# Patient Record
Sex: MALE | Race: BLACK OR AFRICAN AMERICAN | Employment: UNEMPLOYED | URBAN - METROPOLITAN AREA
[De-identification: names, ages, dates, MRNs, and addresses within clinical notes are randomized per-mention and may not be internally consistent; named-entity substitution may affect disease eponyms.]

---

## 2020-06-30 ENCOUNTER — TELEPHONE (OUTPATIENT)
Dept: PHARMACY | Facility: CLINIC | Age: 60
End: 2020-06-30

## 2020-06-30 NOTE — TELEPHONE ENCOUNTER
CLINICAL PHARMACY: ADHERENCE REVIEW  Identified care gap per [employer]; fills at [facility]: ACE/ARB and Statin adherence    Last Office Visit: 4/23/20    Patient identified as LIS = 1, therefore patient may be able to receive a 90-day supply for the same cost as a 30-day supply      ASSESSMENT  ACE/ARB ADHERENCE  PDC: 100%    Per [facility]   AMLOD/BENAZP CAP 5-10MG last filled on 6/26/20 for a 30 day supply; SIG: QD; last picked up on 6/26/20. 0 refills remaining. Patient needs June appt for further refills    BP Readings from Last 3 Encounters:   06/14/19 118/82   12/14/18 120/70   06/01/18 116/68     CrCl cannot be calculated (Patient's most recent lab result is older than the maximum 120 days allowed. ). STATIN ADHERENCE  PDC: 100%    Per [facility]   Atorvastatin 10mg last filled on 6/12/20 for a 90 day supply; M/W/F #38 tabs  Prior 3/21/20    No results found for: CHOL, TRIG, HDL, LDLCHOLESTEROL, LDLCALC, LDLDIRECT  ALT   Date Value Ref Range Status   03/26/2016 32 0 - 40 U/L Final     AST   Date Value Ref Range Status   03/26/2016 30 0 - 39 U/L Final     The ASCVD Risk score (Carlee Zelaya, et al., 2013) failed to calculate for the following reasons: The systolic blood pressure is missing    Cannot find a previous HDL lab    Cannot find a previous total cholesterol lab     PLAN  Reached patient to review. Verified medication instructions     Reminded him he needs to schedule follow up with PCP in order to get more refills. Should be getting 90 day supply.

## 2020-08-13 ENCOUNTER — TELEPHONE (OUTPATIENT)
Dept: PHARMACY | Facility: CLINIC | Age: 60
End: 2020-08-13

## 2020-08-13 NOTE — TELEPHONE ENCOUNTER
CLINICAL PHARMACY: ADHERENCE REVIEW  Identified care gap per [employer]; fills at [facility]: ACE/ARB adherence    Last Office Visit: 4/23/20    Patient also appears to be taking: statin     ASSESSMENT  ACE/ARB ADHERENCE  PDC: 100% in 2019; <85% YTD    Per [facility]   Amlodipine-benazepril 5-10mg daily last filled on 5/21/20 for a 30 day supply; 0 refills remaining. (prev fills: #30 3/20, 4/13)  - [facility] in [facility] reports the 6/26 e-rx was transferred out to [facility] on [address]; [facility] on [facility] reports the only fill they have/see is the 5/21 fill. Neither pharmacy reports still having an active/fillable amlodipine-benazepril rx; [facility] reports they did send refill request on 7/16/20. Per [address] [place] [address] , patient is LIS-1, so eligible for 90-ds at same price as 30-ds. Per 6/22 refill encounter re amlodipine-benazepril: LMOM to schedule pt for follow up appt for refills. Advised pt  sent in a 30 day supply of BP medication. BP Readings from Last 3 Encounters:   06/14/19 118/82   12/14/18 120/70   06/01/18 116/68     CrCl cannot be calculated (Patient's most recent lab result is older than the maximum 120 days allowed. ). STATIN ADHERENCE  PDC: 99% in 2019; 100% YTD    Per Insurance Records   Atorvastatin 10mg Mon,Weds,Fri filled on 3/21/20 & 6/12/20 for a 90 day supply; 2 refills remaining. No results found for: CHOL, TRIG, HDL, LDLCHOLESTEROL, LDLCALC, LDLDIRECT  ALT   Date Value Ref Range Status   03/26/2016 32 0 - 40 U/L Final     AST   Date Value Ref Range Status   03/26/2016 30 0 - 39 U/L Final     The ASCVD Risk score (Apolonia Condon, et al., 2013) failed to calculate for the following reasons: The systolic blood pressure is missing    Cannot find a previous HDL lab    Cannot find a previous total cholesterol lab       PLAN  Reached patient to review. He gave phone to his mother.  Mother reports patient just talked Dr. Esteban Mireles within the past week or two Dr. Esteban Mireles is now with [facility], no longer [facility] ([facility])); states

## 2021-03-23 ENCOUNTER — IMMUNIZATION (OUTPATIENT)
Dept: PRIMARY CARE CLINIC | Age: 61
End: 2021-03-23
Payer: MEDICARE

## 2021-03-23 DIAGNOSIS — Z23 ENCOUNTER FOR IMMUNIZATION: Primary | ICD-10-CM

## 2021-03-23 PROCEDURE — 91301 COVID-19, MODERNA VACCINE 100MCG/0.5ML DOSE: CPT | Performed by: STUDENT IN AN ORGANIZED HEALTH CARE EDUCATION/TRAINING PROGRAM

## 2021-03-23 PROCEDURE — 0011A COVID-19, MODERNA VACCINE 100MCG/0.5ML DOSE: CPT | Performed by: STUDENT IN AN ORGANIZED HEALTH CARE EDUCATION/TRAINING PROGRAM

## 2021-04-06 ENCOUNTER — OFFICE VISIT (OUTPATIENT)
Dept: PRIMARY CARE CLINIC | Age: 61
End: 2021-04-06
Payer: COMMERCIAL

## 2021-04-06 VITALS
SYSTOLIC BLOOD PRESSURE: 162 MMHG | OXYGEN SATURATION: 98 % | HEART RATE: 77 BPM | TEMPERATURE: 97.3 F | DIASTOLIC BLOOD PRESSURE: 113 MMHG

## 2021-04-06 DIAGNOSIS — E78.5 HYPERLIPIDEMIA, UNSPECIFIED HYPERLIPIDEMIA TYPE: ICD-10-CM

## 2021-04-06 DIAGNOSIS — I10 ESSENTIAL HYPERTENSION: Primary | ICD-10-CM

## 2021-04-06 DIAGNOSIS — N40.0 BENIGN PROSTATIC HYPERPLASIA WITHOUT LOWER URINARY TRACT SYMPTOMS: ICD-10-CM

## 2021-04-06 DIAGNOSIS — Z72.0 TOBACCO ABUSE: ICD-10-CM

## 2021-04-06 DIAGNOSIS — J44.9 CHRONIC OBSTRUCTIVE PULMONARY DISEASE, UNSPECIFIED COPD TYPE (HCC): ICD-10-CM

## 2021-04-06 PROCEDURE — 99343 PR HOME VST NEW PATIENT MOD-HI SEVERITY 45 MINUTES: CPT | Performed by: STUDENT IN AN ORGANIZED HEALTH CARE EDUCATION/TRAINING PROGRAM

## 2021-04-06 RX ORDER — ATORVASTATIN CALCIUM 10 MG/1
10 TABLET, FILM COATED ORAL
Qty: 36 TABLET | Refills: 1 | Status: SHIPPED
Start: 2021-04-07 | End: 2022-01-18

## 2021-04-06 RX ORDER — AMLODIPINE BESYLATE AND BENAZEPRIL HYDROCHLORIDE 5; 10 MG/1; MG/1
1 CAPSULE ORAL DAILY
Qty: 90 CAPSULE | Refills: 1 | Status: SHIPPED
Start: 2021-04-06 | End: 2021-06-24 | Stop reason: DRUGHIGH

## 2021-04-06 ASSESSMENT — ENCOUNTER SYMPTOMS
CONSTIPATION: 0
ABDOMINAL PAIN: 0
SHORTNESS OF BREATH: 0
NAUSEA: 0
BACK PAIN: 0
COUGH: 1
DIARRHEA: 0

## 2021-04-06 NOTE — PROGRESS NOTES
[facility]      4/6/2021    Home visit medically necessary in lieu of an office visit due to:  Developmentally delayed, needs personal assistance of others to leave for appointments. Chief Complaint: Lois Patel is a 57 y.o. male with chief complaint of   Chief Complaint   Patient presents with    Hypertension    Establish Care       Assessment & Plan      Essential hypertension  · Patient was previously treated with benazepril, amlodipine, and hydrochlorothiazide but has been out of medications as he has been unable to get to his PCP. · Patient's blood pressure is elevated today but patient is asymptomatic. · Restart combination amlodipine-benazepril 5-10 mg daily. · Follow-up at next visit. If blood pressure still elevated, will consider restarting hydrochlorothiazide. Hyperlipidemia  · Per history with most recent lipid panel in 2018. · Restart atorvastatin 10 mg 3 times a week. · Depending on next lipid panel results, will consider whether this dose needs adjusted. Chronic obstructive pulmonary disease  · Previously recorded in chart but family is unsure if this was formally diagnosed. · Patient does have a longstanding smoking history. · Patient's cough may be related to underlying COPD. · Does not appear to be in exacerbation. · Not currently on any inhalers. · Monitor symptoms and need to initiate inhalers. Benign prostatic hyperplasia  · Per history and previously followed with Dr. Edwin Mera. · Not currently on medications and not endorsing urinary symptoms at this time. · Monitor for recurrent symptoms. Tobacco abuse  · Patient currently smoking 3 cigarettes/day previously was smoking more. · Patient has tried to quit multiple times. · Discussed benefits of cessation and offered a prescription for nicotine replacement. · Patient declined assistance with cessation at this time.       Follow Up:  Return in about 6 weeks (around 5/18/2021), or if symptoms worsen or fail to improve. Subjective:     History of Present Illness:  Genevieve Delaney is a 57 y.o. male with significant past medical history of essential hypertension, COPD, tobacco use, hyperlipidemia, and and intellectual developmental delay who was evaluated in his home for his hypertension and to establish care with a new physician. The patient was seen with his sister Archie Chauhan present. The patient is a generally healthy gentleman who has only been treated for blood pressure in the past.  The patient technically lives next-door to his mother, but spends most of his time with her and assisting with care as her condition has worsened. He takes care of housework and yard work. The patient has some form of intellectual delay but his sister is unsure if there is a specific cause or diagnosis. He completed a special education program and graduated high school. He has been unable to get to his appointments with his previous PCP and has been out of his antihypertensives. The patient states that his only symptom today is of a cough. He has had it for approximately a month. He currently smokes a few cigarettes a day. He is unsure if it is related to this or possibly to allergies. He is to smoke more heavily and has tried to quit in the past.  He is not interested in assistance with quitting at this time. The patient denies chest pain, headaches, dyspnea, lightheadedness. He has no other specific complaints. The patient has a history of BPH and used to see Dr. Ellis Witt for this. He denies urinary symptoms at this time. The patient has not had any routine cancer screenings. Education: Completed high school, special education program.  Occupation: [employer]  Spirituality: [employer], [employer]     HC-POA: Not completed  Living will: Not completed  Code status: Full code      Functional Status (I: Independent, A: Assisted, D: Dependent)  ADLs I A D Notes Bathing [x]  []  []     Dressing [x]  []  []     Toileting [x]  []  []     Transfers [x]  []  []     Feeding [x]  []  []     Ambulation [x]  []  []  Assistive devices: none   IADLs I A D    Telephone [x]  []  []     Transportation []  []  [x]  Driving safety concerns: NA patient does not drive    Shopping []  []  [x]     Meal prep []  []  [x]     Housework []  [x]  []     Medications [x]  []  []     Finances []  []  [x]           Past Medical History:   Diagnosis Date    Benign prostatic hyperplasia     COPD [facility]chronic obstructive pulmonary disease) ([facility] [address]     Developmental delay     Hard of hearing     Hyperlipidemia     Hypertension     Obesity     Tobacco abuse 04/26/2016       Past Surgical History:   Procedure Laterality Date    INGUINAL HERNIA REPAIR      lap left inguinal hernia repair with mesh       Family History   Problem Relation Age of Onset    Hypotension Mother     Dementia Mother     Diabetes Father     COPD Father     Dementia Father        Social History     Socioeconomic History    Marital status: Single     Spouse name: Not on file    Number of children: Not on file    Years of education: Not on file    Highest education level: Not on file   Occupational History    Not on file   Social Needs    Financial resource strain: Not on file    Food insecurity     Worry: Not on file     Inability: Not on file    Transportation needs     Medical: Not on file     Non-medical: Not on file   Tobacco Use    Smoking status: Current Some Day Smoker     Packs/day: 0.12     Years: 35.00     Pack years: 4.20     Types: Cigarettes    Smokeless tobacco: Never Used   Substance and Sexual Activity    Alcohol use: No    Drug use: No    Sexual activity: Not on file   Lifestyle    Physical activity     Days per week: Not on file     Minutes per session: Not on file    Stress: Not on file   Relationships    Social connections     Talks on phone: Not on file     Gets together: Not on file Attends Judaism service: Not on file     Active member of club or organization: Not on file     Attends meetings of clubs or organizations: Not on file     Relationship status: Not on file    Intimate partner violence     Fear of current or ex partner: Not on file     Emotionally abused: Not on file     Physically abused: Not on file     Forced sexual activity: Not on file   Other Topics Concern    Not on file   Social History Narrative    Not on file       Current Medications:  Medications reviewed: yes    Current Outpatient Medications on File Prior to Visit   Medication Sig Dispense Refill    amLODIPine-benazepril (LOTREL) 5-10 MG per capsule TAKE 1 CAPSULE BY MOUTH DAILY. 30 capsule 0    atorvastatin (LIPITOR) 10 MG tablet TAKE 1 TABLET BY MOUTH EVERY MONDAY WEDNESDAY AND FRIDAY 38 tablet 2    hydroCHLOROthiazide (HYDRODIURIL) 25 MG tablet 1 daily 30 tablet 2    aspirin 81 MG tablet Take 81 mg by mouth daily       No current facility-administered medications on file prior to visit. Review of Systems   Constitutional: Negative for appetite change, chills, fever and unexpected weight change. Eyes: Negative for visual disturbance. Respiratory: Positive for cough. Negative for shortness of breath. Cardiovascular: Negative for chest pain and leg swelling. Gastrointestinal: Negative for abdominal pain, constipation, diarrhea and nausea. Genitourinary: Negative for difficulty urinating and dysuria. Musculoskeletal: Negative for arthralgias, back pain and myalgias. Skin: Negative for rash and wound. Neurological: Negative for dizziness, syncope, light-headedness and headaches. Psychiatric/Behavioral: Negative for dysphoric mood. The patient is not nervous/anxious. Objective:     Physical Exam  BP (!) 162/113   Pulse 77   Temp 97.3 °F (36.3 °C)   SpO2 98%     Physical Exam  Vitals signs reviewed. Constitutional:       General: He is not in acute distress.      Appearance: He is well-developed. He is not ill-appearing. HENT:      Head: Normocephalic and atraumatic. Right Ear: External ear normal.      Left Ear: External ear normal.      Nose: Nose normal.      Mouth/Throat:      Mouth: Mucous membranes are moist.      Pharynx: Oropharynx is clear. Eyes:      Extraocular Movements: Extraocular movements intact. Conjunctiva/sclera: Conjunctivae normal.      Pupils: Pupils are equal, round, and reactive to light. Neck:      Musculoskeletal: Neck supple. Thyroid: No thyromegaly. Vascular: No carotid bruit. Cardiovascular:      Rate and Rhythm: Normal rate and regular rhythm. Pulses: Normal pulses. Heart sounds: Normal heart sounds. Pulmonary:      Effort: Pulmonary effort is normal.      Breath sounds: Normal breath sounds. No wheezing, rhonchi or rales. Abdominal:      General: Bowel sounds are normal. There is no distension. Palpations: Abdomen is soft. There is no hepatomegaly. Tenderness: There is no abdominal tenderness. Hernia: No hernia is present. Musculoskeletal:         General: No tenderness. Right lower leg: No edema. Left lower leg: No edema. Lymphadenopathy:      Cervical: No cervical adenopathy. Skin:     General: Skin is warm and dry. Neurological:      General: No focal deficit present. Mental Status: He is alert. Mental status is at baseline. Cranial Nerves: Cranial nerves are intact. Sensory: Sensation is intact. Motor: Motor function is intact. No tremor. Deep Tendon Reflexes:      Reflex Scores:       Bicep reflexes are 2+ on the right side and 2+ on the left side. Psychiatric:         Attention and Perception: Attention normal.         Mood and Affect: Mood and affect normal.         Speech: Speech normal.         Behavior: Behavior normal. Behavior is cooperative.          Recent Labs:    Lab Results   Component Value Date    WBC 7.7 03/26/2016    HGB 13.8 03/26/2016 HCT 41.5 03/26/2016    MCV 89.7 03/26/2016     03/26/2016    LYMPHOPCT 9 (L) 03/26/2016    RBC 4.63 03/26/2016    MCH 29.9 03/26/2016    MCHC 33.3 03/26/2016    RDW 13.6 03/26/2016       Lab Results   Component Value Date     03/26/2016    K 3.9 03/26/2016    CL 96 (L) 03/26/2016    CO2 23 03/26/2016    BUN 13 03/26/2016    CREATININE 1.2 03/26/2016    GLUCOSE 115 (H) 03/26/2016    CALCIUM 8.8 03/26/2016    PROT 7.3 03/26/2016    LABALBU 3.5 03/26/2016    BILITOT 0.5 03/26/2016    ALKPHOS 50 03/26/2016    AST 30 03/26/2016    ALT 32 03/26/2016    LABGLOM >60 03/26/2016    GFRAA >60 03/26/2016       No results found for: VITD25, TSH    No results found for: LABA1C    No results found for: INR, PROTIME       Gareth Garcia MD  4/6/2021    This report was generated using a computer driven dictation system. This system may results in transcription errors. Every effort is made to identify and correct errors, however errors may still occur. General

## 2021-04-20 ENCOUNTER — IMMUNIZATION (OUTPATIENT)
Dept: PRIMARY CARE CLINIC | Age: 61
End: 2021-04-20
Payer: MEDICARE

## 2021-04-20 DIAGNOSIS — Z23 ENCOUNTER FOR IMMUNIZATION: Primary | ICD-10-CM

## 2021-04-20 PROCEDURE — 91301 COVID-19, MODERNA VACCINE 100MCG/0.5ML DOSE: CPT | Performed by: STUDENT IN AN ORGANIZED HEALTH CARE EDUCATION/TRAINING PROGRAM

## 2021-04-20 PROCEDURE — 0012A COVID-19, MODERNA VACCINE 100MCG/0.5ML DOSE: CPT | Performed by: STUDENT IN AN ORGANIZED HEALTH CARE EDUCATION/TRAINING PROGRAM

## 2021-04-20 NOTE — PROGRESS NOTES
[address] was seen for COVID-19 vaccination at his home as part of the [facility]. The patient was seen for dose 2 of the Moderna vaccine. The EUA was reviewed with the patient and all questions answered. The attestation was reviewed without contraindications to the immunization and the patient consented to the immunization. The patient's medications and allergies were reviewed. The patient was given the vaccine intramuscularly into the left deltoid. The patient tolerated the immunization well without obvious adverse events. The patient was observed for at least 15 minutes. At the time of leaving the home, the patient was doing well and at baseline condition. The patient was given the [facility] COVID-19 vaccination record card and instructed to keep it safe and readily available. The patient was educated regarding the potential side effects and given instructions that for lethargy, lightheadedness, difficulty breathing, or other acute reactions the patient should call 911 for evaluation in the ED.     Renu Mcpherson MD  4/20/2021

## 2021-05-01 PROBLEM — N40.0 BENIGN PROSTATIC HYPERPLASIA: Status: ACTIVE | Noted: 2021-05-01

## 2021-06-24 ENCOUNTER — OFFICE VISIT (OUTPATIENT)
Dept: PRIMARY CARE CLINIC | Age: 61
End: 2021-06-24
Payer: COMMERCIAL

## 2021-06-24 VITALS
HEART RATE: 80 BPM | SYSTOLIC BLOOD PRESSURE: 135 MMHG | TEMPERATURE: 97.4 F | OXYGEN SATURATION: 97 % | DIASTOLIC BLOOD PRESSURE: 97 MMHG

## 2021-06-24 DIAGNOSIS — Z72.0 TOBACCO ABUSE: ICD-10-CM

## 2021-06-24 DIAGNOSIS — E78.5 HYPERLIPIDEMIA, UNSPECIFIED HYPERLIPIDEMIA TYPE: ICD-10-CM

## 2021-06-24 DIAGNOSIS — N40.0 BENIGN PROSTATIC HYPERPLASIA WITHOUT LOWER URINARY TRACT SYMPTOMS: ICD-10-CM

## 2021-06-24 DIAGNOSIS — J44.9 CHRONIC OBSTRUCTIVE PULMONARY DISEASE, UNSPECIFIED COPD TYPE (HCC): ICD-10-CM

## 2021-06-24 DIAGNOSIS — I10 ESSENTIAL HYPERTENSION: Primary | ICD-10-CM

## 2021-06-24 DIAGNOSIS — Z12.5 ENCOUNTER FOR PROSTATE CANCER SCREENING: ICD-10-CM

## 2021-06-24 PROCEDURE — 99349 HOME/RES VST EST MOD MDM 40: CPT | Performed by: STUDENT IN AN ORGANIZED HEALTH CARE EDUCATION/TRAINING PROGRAM

## 2021-06-24 RX ORDER — AMLODIPINE BESYLATE AND BENAZEPRIL HYDROCHLORIDE 10; 20 MG/1; MG/1
1 CAPSULE ORAL DAILY
Qty: 90 CAPSULE | Refills: 1 | Status: SHIPPED
Start: 2021-06-24 | End: 2021-12-14

## 2021-06-24 ASSESSMENT — ENCOUNTER SYMPTOMS
SHORTNESS OF BREATH: 0
CONSTIPATION: 0
ABDOMINAL PAIN: 0
BACK PAIN: 0
NAUSEA: 0
COUGH: 0

## 2021-06-24 NOTE — PROGRESS NOTES
[facility]      2021    Home visit medically necessary in lieu of an office visit due to:  Developmentally delayed, needs personal assistance of others to leave for appointments. Chief Complaint: Abel Bishop is a 57 y.o. male with chief complaint of   Chief Complaint   Patient presents with    Hypertension    Hyperlipidemia       Assessment & Plan      Essential hypertension  · Patient was previously treated with benazepril, amlodipine, and hydrochlorothiazide but had been out of these since being lost to follow-up with PCP. · Patient's blood pressure is improved after restarting amlodipine-benazepril but given his generally good health and younger age, will attempt tighter control. · Increase amlodipine-benazepril to 10-20 mg daily. · Follow-up at next visit. If blood pressure still elevated, will consider restarting hydrochlorothiazide. Hyperlipidemia  · Per history with most recent lipid panel in 2018. · Restarted atorvastatin 10 mg 3 times a week. · Order for lipid panel placed. Chronic obstructive pulmonary disease  · Previously recorded in chart but family is unsure if this was formally diagnosed. · Patient does have a longstanding smoking history. · Patient's cough may be related to underlying COPD. · Does not appear to be in exacerbation. · Not currently on any inhalers. · Monitor symptoms and need to initiate inhalers. Benign prostatic hyperplasia  · Per history and previously followed with Dr. Yaneth Ochoa. · Not currently on medications and not endorsing urinary symptoms at this time. · Monitor for recurrent symptoms. Recheck PSA. Tobacco abuse  · Patient currently smoking 2-3 cigarettes/day and previously was smoking more. · Patient has tried to quit multiple times, and reports most recently he had a setback after his mother . · Discussed benefits of cessation and offered a prescription for nicotine replacement.   · Patient declined assistance with cessation at this time. Encounter for prostate cancer screening  · Order for PSA placed. Follow Up:  Return in about 8 weeks (around 2021), or if symptoms worsen or fail to improve. Subjective:     History of Present Illness:  Toshia Delgadillo is a 57 y.o. male with significant past medical history of essential hypertension, COPD, tobacco use, hyperlipidemia, and and intellectual developmental delay who was evaluated in his home for multiple medical issues. The patient states that he is feeling well. He denies any pain at this time. He denies any chest pain, headaches, or lightheadedness. He feels that his breathing is doing well. He denies any difficulty urinating related to his BPH. He continues to smoke a few cigarettes per day and has been cutting back. He states that he was doing well at this however his mother . His sister feels that he is doing well and has no specific concerns. His family is interested in getting him routine labs. The patient denies any specific needs at this time. Education: Completed high school, special education program.  Occupation: [employer]  Spirituality: [employer], [place].     HC-POA: Not completed  Living will: Not completed  Code status: Full code      Functional Status (I: Independent, A: Assisted, D: Dependent)  ADLs I A D Notes   Bathing [x]  []  []     Dressing [x]  []  []     Toileting [x]  []  []     Transfers [x]  []  []     Feeding [x]  []  []     Ambulation [x]  []  []  Assistive devices: none   IADLs I A D    Telephone [x]  []  []     Transportation []  []  [x]  Driving safety concerns: NA patient does not drive    Shopping []  []  [x]     Meal prep []  []  [x]     Housework []  [x]  []     Medications [x]  []  []     Finances []  []  [x]           Past Medical History:   Diagnosis Date    Benign prostatic hyperplasia     [facility]ch[facility] obstructive [facility] disease) ([facility] [address]     Developmental delay     Hard of hearing     Hyperlipidemia     Hypertension     Obesity     Tobacco abuse 04/26/2016       Past Surgical History:   Procedure Laterality Date    INGUINAL HERNIA REPAIR      lap left inguinal hernia repair with mesh       Family History   Problem Relation Age of Onset    Hypotension Mother     Dementia Mother     Diabetes Father     COPD Father     Dementia Father        Social History     Socioeconomic History    Marital status: Single     Spouse name: Not on file    Number of children: Not on file    Years of education: Not on file    Highest education level: Not on file   Occupational History    Not on file   Tobacco Use    Smoking status: Current Some Day Smoker     Packs/day: 0.12     Years: 35.00     Pack years: 4.20     Types: Cigarettes    Smokeless tobacco: Never Used   Substance and Sexual Activity    Alcohol use: No    Drug use: No    Sexual activity: Not on file   Other Topics Concern    Not on file   Social History Narrative    Not on file     Social Determinants of Health     Financial Resource Strain:     Difficulty of Paying Living Expenses:    Food Insecurity:     Worried About Running Out of Food in the Last Year:     Ran Out of Food in the Last Year:    Transportation Needs:     Lack of Transportation (Medical):      Lack of Transportation (Non-Medical):    Physical Activity:     Days of Exercise per Week:     Minutes of Exercise per Session:    Stress:     Feeling of Stress :    Social Connections:     Frequency of Communication with Friends and Family:     Frequency of Social Gatherings with Friends and Family:     Attends Sabianist Services:     Active Member of Clubs or Organizations:     Attends Club or Organization Meetings:     Marital Status:    Intimate Partner Violence:     Fear of Current or Ex-Partner:     Emotionally Abused:     Physically Abused:     Sexually Abused:        Current Medications:  Medications reviewed: Right lower leg: No edema. Left lower leg: No edema. Lymphadenopathy:      Cervical: No cervical adenopathy. Skin:     General: Skin is warm and dry. Neurological:      General: No focal deficit present. Mental Status: He is alert. Mental status is at baseline. Psychiatric:         Attention and Perception: Attention normal.         Mood and Affect: Mood and affect normal.         Speech: Speech normal.         Behavior: Behavior normal. Behavior is cooperative. Recent Labs:    Lab Results   Component Value Date    WBC 7.7 03/26/2016    HGB 13.8 03/26/2016    HCT 41.5 03/26/2016    MCV 89.7 03/26/2016     03/26/2016    LYMPHOPCT 9 (L) 03/26/2016    RBC 4.63 03/26/2016    MCH 29.9 03/26/2016    MCHC 33.3 03/26/2016    RDW 13.6 03/26/2016       Lab Results   Component Value Date     03/26/2016    K 3.9 03/26/2016    CL 96 (L) 03/26/2016    CO2 23 03/26/2016    BUN 13 03/26/2016    CREATININE 1.2 03/26/2016    GLUCOSE 115 (H) 03/26/2016    CALCIUM 8.8 03/26/2016    PROT 7.3 03/26/2016    LABALBU 3.5 03/26/2016    BILITOT 0.5 03/26/2016    ALKPHOS 50 03/26/2016    AST 30 03/26/2016    ALT 32 03/26/2016    LABGLOM >60 03/26/2016    GFRAA >60 03/26/2016       No results found for: VITD25, TSH    No results found for: LABA1C    No results found for: INR, PROTIME       Adam Gamboa MD  6/24/2021    This report was generated using a computer driven dictation system. This system may results in transcription errors. Every effort is made to identify and correct errors, however errors may still occur.

## 2021-08-17 ENCOUNTER — HOSPITAL ENCOUNTER (OUTPATIENT)
Age: 61
Discharge: HOME | End: 2021-08-17
Payer: COMMERCIAL

## 2021-08-17 DIAGNOSIS — Z72.0 TOBACCO ABUSE: ICD-10-CM

## 2021-08-17 DIAGNOSIS — N40.0 BENIGN PROSTATIC HYPERPLASIA WITHOUT LOWER URINARY TRACT SYMPTOMS: ICD-10-CM

## 2021-08-17 DIAGNOSIS — J44.9 CHRONIC OBSTRUCTIVE PULMONARY DISEASE, UNSPECIFIED COPD TYPE (HCC): ICD-10-CM

## 2021-08-17 DIAGNOSIS — E78.5 HYPERLIPIDEMIA, UNSPECIFIED HYPERLIPIDEMIA TYPE: ICD-10-CM

## 2021-08-17 DIAGNOSIS — Z12.5 ENCOUNTER FOR PROSTATE CANCER SCREENING: ICD-10-CM

## 2021-08-17 DIAGNOSIS — I10 ESSENTIAL HYPERTENSION: ICD-10-CM

## 2021-08-17 LAB
ALBUMIN SERPL-MCNC: 3.9 G/DL (ref 3.5–5.2)
ALP BLD-CCNC: 83 U/L (ref 40–129)
ALT SERPL-CCNC: 33 U/L (ref 0–40)
ANION GAP SERPL CALCULATED.3IONS-SCNC: 11 MMOL/L (ref 7–16)
AST SERPL-CCNC: 24 U/L (ref 0–39)
BASOPHILS ABSOLUTE: 0.05 E9/L (ref 0–0.2)
BASOPHILS RELATIVE PERCENT: 0.5 % (ref 0–2)
BILIRUB SERPL-MCNC: 0.5 MG/DL (ref 0–1.2)
BUN BLDV-MCNC: 10 MG/DL (ref 6–23)
CALCIUM SERPL-MCNC: 9.6 MG/DL (ref 8.6–10.2)
CHLORIDE BLD-SCNC: 103 MMOL/L (ref 98–107)
CHOLESTEROL, TOTAL: 115 MG/DL (ref 0–199)
CO2: 25 MMOL/L (ref 22–29)
CREAT SERPL-MCNC: 1.1 MG/DL (ref 0.7–1.2)
EOSINOPHILS ABSOLUTE: 0.21 E9/L (ref 0.05–0.5)
EOSINOPHILS RELATIVE PERCENT: 2.2 % (ref 0–6)
GFR AFRICAN AMERICAN: >60
GFR NON-AFRICAN AMERICAN: >60 ML/MIN/1.73
GLUCOSE BLD-MCNC: 115 MG/DL (ref 74–99)
HCT VFR BLD CALC: 43.2 % (ref 37–54)
HDLC SERPL-MCNC: 45 MG/DL
HEMOGLOBIN: 14.3 G/DL (ref 12.5–16.5)
IMMATURE GRANULOCYTES #: 0.05 E9/L
IMMATURE GRANULOCYTES %: 0.5 % (ref 0–5)
LDL CHOLESTEROL CALCULATED: 50 MG/DL (ref 0–99)
LYMPHOCYTES ABSOLUTE: 2.72 E9/L (ref 1.5–4)
LYMPHOCYTES RELATIVE PERCENT: 28 % (ref 20–42)
MCH RBC QN AUTO: 29 PG (ref 26–35)
MCHC RBC AUTO-ENTMCNC: 33.1 % (ref 32–34.5)
MCV RBC AUTO: 87.6 FL (ref 80–99.9)
MONOCYTES ABSOLUTE: 0.65 E9/L (ref 0.1–0.95)
MONOCYTES RELATIVE PERCENT: 6.7 % (ref 2–12)
NEUTROPHILS ABSOLUTE: 6.05 E9/L (ref 1.8–7.3)
NEUTROPHILS RELATIVE PERCENT: 62.1 % (ref 43–80)
PDW BLD-RTO: 13.6 FL (ref 11.5–15)
PLATELET # BLD: 293 E9/L (ref 130–450)
PMV BLD AUTO: 9.2 FL (ref 7–12)
POTASSIUM SERPL-SCNC: 4.1 MMOL/L (ref 3.5–5)
PROSTATE SPECIFIC ANTIGEN: 0.41 NG/ML (ref 0–4)
RBC # BLD: 4.93 E12/L (ref 3.8–5.8)
SODIUM BLD-SCNC: 139 MMOL/L (ref 132–146)
TOTAL PROTEIN: 7.6 G/DL (ref 6.4–8.3)
TRIGL SERPL-MCNC: 98 MG/DL (ref 0–149)
VLDLC SERPL CALC-MCNC: 20 MG/DL
WBC # BLD: 9.7 E9/L (ref 4.5–11.5)

## 2021-08-17 PROCEDURE — 80053 COMPREHEN METABOLIC PANEL: CPT

## 2021-08-17 PROCEDURE — 36415 COLL VENOUS BLD VENIPUNCTURE: CPT

## 2021-08-17 PROCEDURE — G0103 PSA SCREENING: HCPCS

## 2021-08-17 PROCEDURE — 80061 LIPID PANEL: CPT

## 2021-08-17 PROCEDURE — 85025 COMPLETE CBC W/AUTO DIFF WBC: CPT

## 2021-08-19 ENCOUNTER — OFFICE VISIT (OUTPATIENT)
Dept: PRIMARY CARE CLINIC | Age: 61
End: 2021-08-19
Payer: COMMERCIAL

## 2021-08-19 DIAGNOSIS — Z11.59 ENCOUNTER FOR HEPATITIS C SCREENING TEST FOR LOW RISK PATIENT: ICD-10-CM

## 2021-08-19 DIAGNOSIS — I10 ESSENTIAL HYPERTENSION: Primary | ICD-10-CM

## 2021-08-19 DIAGNOSIS — N40.0 BENIGN PROSTATIC HYPERPLASIA WITHOUT LOWER URINARY TRACT SYMPTOMS: ICD-10-CM

## 2021-08-19 DIAGNOSIS — E78.5 HYPERLIPIDEMIA, UNSPECIFIED HYPERLIPIDEMIA TYPE: ICD-10-CM

## 2021-08-19 DIAGNOSIS — R73.9 HYPERGLYCEMIA: ICD-10-CM

## 2021-08-19 DIAGNOSIS — Z72.0 TOBACCO ABUSE: ICD-10-CM

## 2021-08-19 DIAGNOSIS — J44.9 CHRONIC OBSTRUCTIVE PULMONARY DISEASE, UNSPECIFIED COPD TYPE (HCC): ICD-10-CM

## 2021-08-19 PROCEDURE — 99349 HOME/RES VST EST MOD MDM 40: CPT | Performed by: STUDENT IN AN ORGANIZED HEALTH CARE EDUCATION/TRAINING PROGRAM

## 2021-08-19 ASSESSMENT — ENCOUNTER SYMPTOMS
BACK PAIN: 0
COUGH: 1
CONSTIPATION: 0
SHORTNESS OF BREATH: 0
ABDOMINAL PAIN: 0

## 2021-08-19 ASSESSMENT — PATIENT HEALTH QUESTIONNAIRE - PHQ9
1. LITTLE INTEREST OR PLEASURE IN DOING THINGS: 0
SUM OF ALL RESPONSES TO PHQ9 QUESTIONS 1 & 2: 0
2. FEELING DOWN, DEPRESSED OR HOPELESS: 0
SUM OF ALL RESPONSES TO PHQ QUESTIONS 1-9: 0

## 2021-08-19 NOTE — PROGRESS NOTES
[facility]      2021    Home visit medically necessary in lieu of an office visit due to:  Developmentally delayed, needs personal assistance of others to leave for appointments. Chief Complaint: Yesenia Duncan is a 57 y.o. male with chief complaint of   Chief Complaint   Patient presents with    Hypertension       Assessment & Plan      Essential hypertension  · Patient was previously treated with benazepril, amlodipine, and hydrochlorothiazide but had been out of these since being lost to follow-up with PCP. · Blood pressures improved after increasing amlodipine-benazepril. · At this time, continue amlodipine-benazepril 10-20 mg daily. Hyperlipidemia  · Most recent cholesterol panel well controlled in 2021. · Continue atorvastatin 10 mg 3 times a week. Chronic obstructive pulmonary disease  · Previously recorded in chart but family is unsure if this was formally diagnosed. · Patient does have a longstanding smoking history. · Patient's cough may be related to underlying COPD. · Does not appear to be in exacerbation. · Not currently on any inhalers. · Monitor symptoms and need to initiate inhalers. Hyperglycemia  · Patient with mildly elevated glucose on fasting labs. · Check hemoglobin A1c. Benign prostatic hyperplasia  · Per history and previously followed with Dr. Dale Tillman. · Not currently on medications and not endorsing urinary symptoms at this time. · PSA 0.41 in 2021. · Monitor for recurrent symptoms. Tobacco abuse  · Patient currently smoking 2-3 cigarettes/day and previously was smoking more. · Patient has tried to quit multiple times, and reports most recently he had a setback after his mother . · Discussed benefits of cessation and offered a prescription for nicotine replacement. · Patient declined assistance with cessation at this time.     Encounter for hepatitis C virus screening  · Order for hepatitis C virus antibody placed      Follow Up:  Return in about 8 weeks (around 10/14/2021), or if symptoms worsen or fail to improve. Subjective:     History of Present Illness:  Marina Delaney is a 57 y.o. male with significant past medical history of essential hypertension, COPD, tobacco use, hyperlipidemia, and and intellectual developmental delay who was evaluated in his home for multiple medical issues. He is doing well. He does have some grief related to his mother's passing which happened approximately 3 months ago. He has been doing well with this, and his sister reports that it seems to be normal grieving as he is no longer going over to her house to see her like he previously was. He denies any major issues at this time. He does have a mild chronic cough. He continues to smoke about 2 cigarettes a day which is not increasing. He is not interested in quitting at this time. He denies any chest pain, lightheadedness, headaches. He denies any other issues. Discussed the patient's labs with the patient and sister. Discussed that based on these, it would be a good idea to check hemoglobin A1c. Also discussed that if he is going out to get blood work, it is recommended that adults get a one-time HCV and HIV screen. His sister was okay with the HCV check but declined the HIV screen. They deny any needs at this time. Education: Completed high school, special education program.  Occupation: [employer]  Spirituality: Yaz[employer], [employer].     HC-POA: Not completed  Living will: Not completed  Code status: Full code      Functional Status (I: Independent, A: Assisted, D: Dependent)  ADLs I A D Notes   Bathing [x]  []  []     Dressing [x]  []  []     Toileting [x]  []  []     Transfers [x]  []  []     Feeding [x]  []  []     Ambulation [x]  []  []  Assistive devices: none   IADLs I A D    Telephone [x]  []  []     Transportation []  []  [x]  Driving safety concerns: NA patient does not drive Shopping []  []  [x]     Meal prep []  []  [x]     Housework []  [x]  []     Medications [x]  []  []     Finances []  []  [x]           Past Medical History:   Diagnosis Date    Benign prostatic hyperplasia     COPD [facility]chronic obstructive pulmonary disease) ([facility] [address]     Developmental delay     Hard of hearing     Hyperlipidemia     Hypertension     Obesity     Tobacco abuse 04/26/2016       Past Surgical History:   Procedure Laterality Date    INGUINAL HERNIA REPAIR      lap left inguinal hernia repair with mesh       Family History   Problem Relation Age of Onset    Hypotension Mother     Dementia Mother     Diabetes Father     COPD Father     Dementia Father        Social History     Socioeconomic History    Marital status: Single     Spouse name: Not on file    Number of children: Not on file    Years of education: Not on file    Highest education level: Not on file   Occupational History    Not on file   Tobacco Use    Smoking status: Current Some Day Smoker     Packs/day: 0.12     Years: 35.00     Pack years: 4.20     Types: Cigarettes    Smokeless tobacco: Never Used   Substance and Sexual Activity    Alcohol use: No    Drug use: No    Sexual activity: Not on file   Other Topics Concern    Not on file   Social History Narrative    Not on file     Social Determinants of Health     Financial Resource Strain:     Difficulty of Paying Living Expenses:    Food Insecurity:     Worried About Running Out of Food in the Last Year:     Ran Out of Food in the Last Year:    Transportation Needs:     Lack of Transportation (Medical):      Lack of Transportation (Non-Medical):    Physical Activity:     Days of Exercise per Week:     Minutes of Exercise per Session:    Stress:     Feeling of Stress :    Social Connections:     Frequency of Communication with Friends and Family:     Frequency of Social Gatherings with Friends and Family:     Attends Mormonism Services:     Active Member of Clubs or Organizations:    [facility] or Organization Meetings:     Marital Status:    Intimate Partner Violence:     Fear of Current or Ex-Partner:     Emotionally Abused:     Physically Abused:     Sexually Abused:        Current Medications:  Medications reviewed: yes    Current Outpatient Medications on File Prior to Visit   Medication Sig Dispense Refill    amLODIPine-benazepril (LOTREL) 10-20 MG per capsule Take 1 capsule by mouth daily 90 capsule 1    atorvastatin (LIPITOR) 10 MG tablet Take 1 tablet by mouth three times a week TAKE 1 TABLET BY MOUTH EVERY MONDAY WEDNESDAY AND FRIDAY 36 tablet 1    aspirin 81 MG tablet Take 81 mg by mouth daily       No current facility-administered medications on file prior to visit. Review of Systems   Constitutional: Positive for unexpected weight change (slight decline). Negative for appetite change. Respiratory: Positive for cough (mild). Negative for shortness of breath. Cardiovascular: Negative for chest pain. Gastrointestinal: Negative for abdominal pain and constipation. Genitourinary: Negative for difficulty urinating. Musculoskeletal: Negative for arthralgias, back pain and myalgias. Neurological: Negative for light-headedness and headaches. Psychiatric/Behavioral: Positive for sleep disturbance. Negative for dysphoric mood. The patient is not nervous/anxious. Objective:     Physical Exam  /88   Pulse 71   Temp 97.1 °F (36.2 °C)   SpO2 98%     Physical Exam  Vitals reviewed. Constitutional:       General: He is not in acute distress. Appearance: He is well-developed. He is not ill-appearing. HENT:      Head: Normocephalic and atraumatic. Mouth/Throat:      Mouth: Mucous membranes are moist.   Eyes:      Conjunctiva/sclera: Conjunctivae normal.      Pupils: Pupils are equal, round, and reactive to light. Neck:      Thyroid: No thyromegaly. Vascular: No carotid bruit.    Cardiovascular:      Rate and Rhythm: Normal rate and regular rhythm. Pulses: Normal pulses. Heart sounds: Normal heart sounds. Pulmonary:      Effort: Pulmonary effort is normal.      Breath sounds: Normal breath sounds. No wheezing, rhonchi or rales. Abdominal:      General: Bowel sounds are normal. There is no distension. Palpations: Abdomen is soft. Tenderness: There is no abdominal tenderness. Musculoskeletal:      Right lower leg: No edema. Left lower leg: No edema. Lymphadenopathy:      Cervical: No cervical adenopathy. Skin:     General: Skin is warm and dry. Neurological:      General: No focal deficit present. Mental Status: He is alert. Mental status is at baseline. Psychiatric:         Attention and Perception: Attention normal.         Mood and Affect: Mood and affect normal.         Speech: Speech normal.         Behavior: Behavior normal. Behavior is cooperative. Recent Labs:    Lab Results   Component Value Date    WBC 9.7 08/17/2021    HGB 14.3 08/17/2021    HCT 43.2 08/17/2021    MCV 87.6 08/17/2021     08/17/2021    LYMPHOPCT 28.0 08/17/2021    RBC 4.93 08/17/2021    MCH 29.0 08/17/2021    MCHC 33.1 08/17/2021    RDW 13.6 08/17/2021       Lab Results   Component Value Date     08/17/2021    K 4.1 08/17/2021     08/17/2021    CO2 25 08/17/2021    BUN 10 08/17/2021    CREATININE 1.1 08/17/2021    GLUCOSE 115 (H) 08/17/2021    CALCIUM 9.6 08/17/2021    PROT 7.6 08/17/2021    LABALBU 3.9 08/17/2021    BILITOT 0.5 08/17/2021    ALKPHOS 83 08/17/2021    AST 24 08/17/2021    ALT 33 08/17/2021    LABGLOM >60 08/17/2021    GFRAA >60 08/17/2021       No results found for: VITD25, TSH    No results found for: LABA1C    No results found for: INR, PROTIME       Clarice Florence MD  8/19/2021    This report was generated using a computer driven dictation system. This system may results in transcription errors.  Every effort is made to identify and correct errors, however errors may still occur.

## 2021-10-19 ENCOUNTER — OFFICE VISIT (OUTPATIENT)
Dept: PRIMARY CARE CLINIC | Age: 61
End: 2021-10-19
Payer: COMMERCIAL

## 2021-10-19 VITALS
OXYGEN SATURATION: 96 % | SYSTOLIC BLOOD PRESSURE: 132 MMHG | DIASTOLIC BLOOD PRESSURE: 93 MMHG | HEART RATE: 76 BPM | TEMPERATURE: 97.1 F

## 2021-10-19 DIAGNOSIS — N40.0 BENIGN PROSTATIC HYPERPLASIA WITHOUT LOWER URINARY TRACT SYMPTOMS: ICD-10-CM

## 2021-10-19 DIAGNOSIS — J44.9 CHRONIC OBSTRUCTIVE PULMONARY DISEASE, UNSPECIFIED COPD TYPE (HCC): ICD-10-CM

## 2021-10-19 DIAGNOSIS — Z72.0 TOBACCO ABUSE: ICD-10-CM

## 2021-10-19 DIAGNOSIS — E78.5 HYPERLIPIDEMIA, UNSPECIFIED HYPERLIPIDEMIA TYPE: ICD-10-CM

## 2021-10-19 DIAGNOSIS — R73.9 HYPERGLYCEMIA: ICD-10-CM

## 2021-10-19 DIAGNOSIS — I10 ESSENTIAL HYPERTENSION: Primary | ICD-10-CM

## 2021-10-19 PROCEDURE — 99349 HOME/RES VST EST MOD MDM 40: CPT | Performed by: STUDENT IN AN ORGANIZED HEALTH CARE EDUCATION/TRAINING PROGRAM

## 2021-10-19 NOTE — PROGRESS NOTES
replacement. · Patient declined assistance with cessation at this time. Follow Up:  Return in about 3 months (around 1/19/2022). Subjective:     History of Present Illness:  Gilma Maher is a 57 y.o. male with significant past medical history of essential hypertension, COPD, tobacco use, hyperlipidemia, and and intellectual developmental delay who was evaluated in his home for multiple medical issues. They report that he is doing well. He has no chest pain, dyspnea, or cough. He does occasionally get mild dyspnea on exertion. He has no headaches or lightheadedness. He has had no bleeding on his aspirin. He continues to smoke 2-3 cigarettes/day and declines intervention. Discussed blood pressure goals especially in the setting of his younger age and minimal comorbidities. Discussed possibilities of attempting tighter control and increasing medications. His sister was hesitant about possible side effects especially as the patient lives alone. Education: Completed high school, special education program.  Occupation: [employer]  Spirituality: [employer], [employer].     HC-POA: Not completed  Living will: Not completed  Code status: Full code      Functional Status (I: Independent, A: Assisted, D: Dependent)  ADLs I A D Notes   Bathing [x]  []  []     Dressing [x]  []  []     Toileting [x]  []  []     Transfers [x]  []  []     Feeding [x]  []  []     Ambulation [x]  []  []  Assistive devices: none   IADLs I A D    Telephone [x]  []  []     Transportation []  []  [x]  Driving safety concerns: NA patient does not drive    Shopping []  []  [x]     Meal prep []  []  [x]     Housework []  [x]  []     Medications [x]  []  []     Finances []  []  [x]           Past Medical History:   Diagnosis Date    Benign prostatic hyperplasia     [facility]ch[facility] obstructive [facility] disease) ([facility] [address]     Developmental delay     Hard of hearing     Hyperlipidemia     Hypertension     Obesity     Tobacco abuse 04/26/2016       Past Surgical History:   Procedure Laterality Date    INGUINAL HERNIA REPAIR      lap left inguinal hernia repair with mesh       Family History   Problem Relation Age of Onset    Hypotension Mother     Dementia Mother     Diabetes Father     COPD Father     Dementia Father        Social History     Socioeconomic History    Marital status: Single     Spouse name: Not on file    Number of children: Not on file    Years of education: Not on file    Highest education level: Not on file   Occupational History    Not on file   Tobacco Use    Smoking status: Current Some Day Smoker     Packs/day: 0.12     Years: 35.00     Pack years: 4.20     Types: Cigarettes    Smokeless tobacco: Never Used   Substance and Sexual Activity    Alcohol use: No    Drug use: No    Sexual activity: Not on file   Other Topics Concern    Not on file   Social History Narrative    Not on file     Social Determinants of Health     Financial Resource Strain:     Difficulty of Paying Living Expenses:    Food Insecurity:     Worried About Running Out of Food in the Last Year:     Ran Out of Food in the Last Year:    Transportation Needs:     Lack of Transportation (Medical):      Lack of Transportation (Non-Medical):    Physical Activity:     Days of Exercise per Week:     Minutes of Exercise per Session:    Stress:     Feeling of Stress :    Social Connections:     Frequency of Communication with Friends and Family:     Frequency of Social Gatherings with Friends and Family:     Attends [employer] Services:     Active Member of Clubs or Organizations:     Attends Club or Organization Meetings:     Marital Status:    Intimate Partner Violence:     Fear of Current or Ex-Partner:     Emotionally Abused:     Physically Abused:     Sexually Abused:        Current Medications:  Medications reviewed: yes    Current Outpatient Medications on File Prior to Visit   Medication Sig Dispense Refill  amLODIPine-benazepril (LOTREL) 10-20 MG per capsule Take 1 capsule by mouth daily 90 capsule 1    atorvastatin (LIPITOR) 10 MG tablet Take 1 tablet by mouth three times a week TAKE 1 TABLET BY MOUTH EVERY MONDAY WEDNESDAY AND FRIDAY 36 tablet 1    aspirin 81 MG tablet Take 81 mg by mouth daily       No current facility-administered medications on file prior to visit. Review of Systems   Constitutional: Negative for appetite change. Respiratory: Positive for shortness of breath (mild, on exertion). Negative for cough. Cardiovascular: Negative for chest pain. Gastrointestinal: Negative for abdominal pain. Genitourinary: Negative for difficulty urinating. Musculoskeletal: Negative for arthralgias and back pain. Neurological: Negative for light-headedness and headaches. Objective:     Physical Exam  BP (!) 132/93   Pulse 76   Temp 97.1 °F (36.2 °C)   SpO2 96%     Physical Exam  Vitals reviewed. Constitutional:       General: He is not in acute distress. Appearance: He is well-developed. He is not ill-appearing. HENT:      Head: Normocephalic and atraumatic. Mouth/Throat:      Mouth: Mucous membranes are moist.   Neck:      Thyroid: No thyromegaly. Vascular: No carotid bruit. Cardiovascular:      Rate and Rhythm: Normal rate and regular rhythm. Pulses: Normal pulses. Heart sounds: Normal heart sounds. Pulmonary:      Effort: Pulmonary effort is normal.      Breath sounds: Normal breath sounds. No wheezing. Abdominal:      General: Bowel sounds are normal. There is no distension. Palpations: Abdomen is soft. Tenderness: There is no abdominal tenderness. Musculoskeletal:      Right lower leg: No edema. Left lower leg: No edema. Lymphadenopathy:      Cervical: No cervical adenopathy. Skin:     General: Skin is warm and dry. Neurological:      General: No focal deficit present. Mental Status: He is alert.  Mental status is at

## 2021-12-07 VITALS
HEART RATE: 71 BPM | OXYGEN SATURATION: 98 % | DIASTOLIC BLOOD PRESSURE: 88 MMHG | TEMPERATURE: 97.1 F | SYSTOLIC BLOOD PRESSURE: 124 MMHG

## 2021-12-07 ASSESSMENT — ENCOUNTER SYMPTOMS
SHORTNESS OF BREATH: 1
BACK PAIN: 0
ABDOMINAL PAIN: 0
COUGH: 0

## 2022-01-25 ENCOUNTER — OFFICE VISIT (OUTPATIENT)
Dept: PRIMARY CARE CLINIC | Age: 62
End: 2022-01-25
Payer: COMMERCIAL

## 2022-01-25 VITALS
SYSTOLIC BLOOD PRESSURE: 136 MMHG | DIASTOLIC BLOOD PRESSURE: 89 MMHG | TEMPERATURE: 98.3 F | OXYGEN SATURATION: 97 % | HEART RATE: 86 BPM

## 2022-01-25 DIAGNOSIS — R73.9 HYPERGLYCEMIA: ICD-10-CM

## 2022-01-25 DIAGNOSIS — Z72.0 TOBACCO ABUSE: ICD-10-CM

## 2022-01-25 DIAGNOSIS — J44.9 CHRONIC OBSTRUCTIVE PULMONARY DISEASE, UNSPECIFIED COPD TYPE (HCC): ICD-10-CM

## 2022-01-25 DIAGNOSIS — Z23 NEED FOR IMMUNIZATION AGAINST INFLUENZA: ICD-10-CM

## 2022-01-25 DIAGNOSIS — I10 ESSENTIAL HYPERTENSION: Primary | ICD-10-CM

## 2022-01-25 DIAGNOSIS — E78.5 HYPERLIPIDEMIA, UNSPECIFIED HYPERLIPIDEMIA TYPE: ICD-10-CM

## 2022-01-25 DIAGNOSIS — N40.0 BENIGN PROSTATIC HYPERPLASIA WITHOUT LOWER URINARY TRACT SYMPTOMS: ICD-10-CM

## 2022-01-25 PROCEDURE — 90694 VACC AIIV4 NO PRSRV 0.5ML IM: CPT | Performed by: STUDENT IN AN ORGANIZED HEALTH CARE EDUCATION/TRAINING PROGRAM

## 2022-01-25 PROCEDURE — 99349 HOME/RES VST EST MOD MDM 40: CPT | Performed by: STUDENT IN AN ORGANIZED HEALTH CARE EDUCATION/TRAINING PROGRAM

## 2022-01-25 PROCEDURE — 90471 IMMUNIZATION ADMIN: CPT | Performed by: STUDENT IN AN ORGANIZED HEALTH CARE EDUCATION/TRAINING PROGRAM

## 2022-01-25 ASSESSMENT — ENCOUNTER SYMPTOMS
NAUSEA: 0
SHORTNESS OF BREATH: 0
COUGH: 0
BACK PAIN: 0
ABDOMINAL PAIN: 0
CONSTIPATION: 0

## 2022-01-25 NOTE — PROGRESS NOTES
[facility]      2022    Home visit medically necessary in lieu of an office visit due to:  Developmentally delayed, needs personal assistance of others to leave for appointments. Chief Complaint: Humberto Aaron is a 57 y.o. male with chief complaint of   Chief Complaint   Patient presents with    Hypertension       Assessment & Plan      Essential hypertension  · Blood pressures reasonably controlled on current regimen. · Discussed blood pressure goals in the setting of younger age and minimal comorbidities and the risks of long-term organ damage with prolonged elevated blood pressures. Discussed possibly pursuing tighter control. Sister notes hesitation as the patient lives alone in case he had side effects and she is unable to get him regularly due to this now. · At this time, continue amlodipine-benazepril 10-20 mg daily.     Hyperlipidemia  · Most recent cholesterol panel well controlled in 2021. · Continue atorvastatin 10 mg 3 times a week.     Chronic obstructive pulmonary disease  · Previously recorded in chart but family is unsure if this was formally diagnosed. · Patient does have a longstanding smoking history. · Patient's cough may be related to underlying COPD. · Does not appear to be in exacerbation. · Not currently on any inhalers. · Monitor symptoms and need to initiate inhalers.     Hyperglycemia  · Patient with mildly elevated glucose on fasting labs. · Awaiting hemoglobin A1c check.     Benign prostatic hyperplasia  · Per history and previously followed with Dr. Willem Her. · Not currently on medications and not endorsing urinary symptoms at this time. · PSA 0.41 in 2021. · Monitor for recurrent symptoms.     Tobacco abuse  · Patient currently smoking 2-3 cigarettes/day and previously was smoking more. · Patient has tried to quit multiple times, and reports most recently he had a setback after his mother .   · Discussed benefits of cessation and offered a prescription for nicotine replacement. · Patient declined assistance with cessation at this time. Need for immunization against influenza  · Influenza vaccine administered today. Follow Up:  Return in about 3 months (around 4/25/2022), or if symptoms worsen or fail to improve. Subjective:     History of Present Illness:  Charlie Burnett is a 57 y.o. male with significant past medical history of essential hypertension, COPD, tobacco use, hyperlipidemia, and and intellectual developmental delay who was evaluated in his home for multiple medical issues. Later in the visit, his sister called by phone to discuss his condition. He states things are going well. He has no specific complaints. He has no pain at this time. He denies chest pain or shortness of breath. He has no lightheadedness or headaches. He has no GI or  complaints. He feels he is tolerating his medications well without issues. He is taking all 3 of his medications regularly. His sister reports that he is doing well. He recently had his COVID-19 booster shot and tolerated it well. He continues to smoke a small amount and is not interested in cessation at this time. Discussed again that as the patient does otherwise younger and healthier, he may benefit from a more aggressive antihypertensive regimen. His sister is hesitant about making a change, as she has less able to get over to help him due to this now should he have an adverse event. Education: Completed high school, special education program.  Occupation: [employer]  Spirituality: Amish, [employer].     HC-POA: Not completed  Living will: Not completed  Code status: Full code      Functional Status (I: Independent, A: Assisted, D: Dependent)  ADLs I A D Notes   Bathing [x]  []  []     Dressing [x]  []  []     Toileting [x]  []  []     Transfers [x]  []  []     Feeding [x]  []  []     Ambulation [x]  []  []  Assistive devices: none IADLs I A D    Telephone [x]  []  []     Transportation []  []  [x]  Driving safety concerns: NA patient does not drive    Shopping []  []  [x]     Meal prep []  []  [x]     Housework []  [x]  []     Medications [x]  []  []     Finances []  []  [x]           Past Medical History:   Diagnosis Date    Benign prostatic hyperplasia     COPD (chronic obstructive pulmonary disease) ([address]     Developmental delay     Hard of hearing     Hyperlipidemia     Hypertension     Obesity     Tobacco abuse 04/26/2016       Past Surgical History:   Procedure Laterality Date    INGUINAL HERNIA REPAIR      lap left inguinal hernia repair with mesh       Family History   Problem Relation Age of Onset    Hypotension Mother     Dementia Mother     Diabetes Father     COPD Father     Dementia Father        Social History     Socioeconomic History    Marital status: Single     Spouse name: Not on file    Number of children: Not on file    Years of education: Not on file    Highest education level: Not on file   Occupational History    Not on file   Tobacco Use    Smoking status: Current Some Day Smoker     Packs/day: 0.12     Years: 35.00     Pack years: 4.20     Types: Cigarettes    Smokeless tobacco: Never Used   Substance and Sexual Activity    Alcohol use: No    Drug use: No    Sexual activity: Not on file   Other Topics Concern    Not on file   Social History Narrative    Not on file     Social Determinants of Health     Financial Resource Strain:     Difficulty of Paying Living Expenses: Not on file   Food Insecurity:     Worried About Running Out of Food in the Last Year: Not on file    Jeff of Food in the Last Year: Not on file   Transportation Needs:     Lack of Transportation (Medical): Not on file    Lack of Transportation (Non-Medical):  Not on file   Physical Activity:     Days of Exercise per Week: Not on file    Minutes of Exercise per Session: Not on file   Stress:     Feeling of Stress : Not on file   Social Connections:     Frequency of Communication with Friends and Family: Not on file    Frequency of Social Gatherings with Friends and Family: Not on file    Attends Latter-day Services: Not on file    Active Member of Clubs or Organizations: Not on file    Attends Club or Organization Meetings: Not on file    Marital Status: Not on file   Intimate Partner Violence:     Fear of Current or Ex-Partner: Not on file    Emotionally Abused: Not on file    Physically Abused: Not on file    Sexually Abused: Not on file   Housing Stability:     Unable to Pay for Housing in the Last Year: Not on file    Number of [place] in the Last Year: Not on file    Unstable Housing in the Last Year: Not on file       Current Medications:  Medications reviewed: yes    Current Outpatient Medications on File Prior to Visit   Medication Sig Dispense Refill    atorvastatin (LIPITOR) 10 MG tablet Take 1 tablet by mouth three times a week (Monday, Wednesday, Friday) 36 tablet 1    amLODIPine-benazepril (LOTREL) 10-20 MG per capsule Take 1 capsule by mouth daily 90 capsule 1    aspirin 81 MG tablet Take 81 mg by mouth daily       No current facility-administered medications on file prior to visit. Review of Systems   Constitutional: Negative for appetite change, chills and fever. Eyes: Negative for visual disturbance. Respiratory: Negative for cough and shortness of breath. Cardiovascular: Negative for chest pain and palpitations. Gastrointestinal: Negative for abdominal pain, constipation and nausea. Genitourinary: Negative for difficulty urinating and dysuria. Musculoskeletal: Negative for arthralgias and back pain. Neurological: Negative for light-headedness and headaches. Psychiatric/Behavioral: Negative for dysphoric mood. The patient is not nervous/anxious.         Objective:     Physical Exam  /89   Pulse 86   Temp 98.3 °F (36.8 °C)   SpO2 97%     Physical Exam  Vitals reviewed. Constitutional:       General: He is not in acute distress. Appearance: He is well-developed. He is not ill-appearing. HENT:      Head: Normocephalic and atraumatic. Mouth/Throat:      Mouth: Mucous membranes are moist.   Neck:      Thyroid: No thyromegaly. Vascular: No carotid bruit. Cardiovascular:      Rate and Rhythm: Normal rate and regular rhythm. Pulses: Normal pulses. Heart sounds: Normal heart sounds. Pulmonary:      Effort: Pulmonary effort is normal.      Breath sounds: Normal breath sounds. No wheezing. Abdominal:      General: Bowel sounds are normal. There is no distension. Palpations: Abdomen is soft. Tenderness: There is no abdominal tenderness. Musculoskeletal:      Right lower leg: No edema. Left lower leg: No edema. Lymphadenopathy:      Cervical: No cervical adenopathy. Skin:     General: Skin is warm and dry. Neurological:      General: No focal deficit present. Mental Status: He is alert. Mental status is at baseline. Psychiatric:         Attention and Perception: Attention normal.         Mood and Affect: Mood and affect normal.         Speech: Speech normal.         Behavior: Behavior normal. Behavior is cooperative.          Recent Labs:    Lab Results   Component Value Date    WBC 9.7 08/17/2021    HGB 14.3 08/17/2021    HCT 43.2 08/17/2021    MCV 87.6 08/17/2021     08/17/2021    LYMPHOPCT 28.0 08/17/2021    RBC 4.93 08/17/2021    MCH 29.0 08/17/2021    MCHC 33.1 08/17/2021    RDW 13.6 08/17/2021       Lab Results   Component Value Date     08/17/2021    K 4.1 08/17/2021     08/17/2021    CO2 25 08/17/2021    BUN 10 08/17/2021    CREATININE 1.1 08/17/2021    GLUCOSE 115 (H) 08/17/2021    CALCIUM 9.6 08/17/2021    PROT 7.6 08/17/2021    LABALBU 3.9 08/17/2021    BILITOT 0.5 08/17/2021    ALKPHOS 83 08/17/2021    AST 24 08/17/2021    ALT 33 08/17/2021    LABGLOM >60 08/17/2021    GFRAA >60 08/17/2021 No results found for: VITD25, TSH    No results found for: LABA1C    No results found for: INR, PROTIME       Juan Manuel Harvey MD  1/25/2022    This report was generated using a computer driven dictation system. This system may results in transcription errors. Every effort is made to identify and correct errors, however errors may still occur.

## 2022-05-03 ENCOUNTER — OFFICE VISIT (OUTPATIENT)
Dept: PRIMARY CARE CLINIC | Age: 62
End: 2022-05-03
Payer: COMMERCIAL

## 2022-05-03 VITALS
HEART RATE: 84 BPM | TEMPERATURE: 98.2 F | DIASTOLIC BLOOD PRESSURE: 92 MMHG | SYSTOLIC BLOOD PRESSURE: 138 MMHG | OXYGEN SATURATION: 95 %

## 2022-05-03 DIAGNOSIS — R73.9 HYPERGLYCEMIA: ICD-10-CM

## 2022-05-03 DIAGNOSIS — I10 ESSENTIAL HYPERTENSION: Primary | ICD-10-CM

## 2022-05-03 DIAGNOSIS — N40.0 BENIGN PROSTATIC HYPERPLASIA WITHOUT LOWER URINARY TRACT SYMPTOMS: ICD-10-CM

## 2022-05-03 DIAGNOSIS — Z12.11 SCREENING FOR COLON CANCER: ICD-10-CM

## 2022-05-03 DIAGNOSIS — E78.5 HYPERLIPIDEMIA, UNSPECIFIED HYPERLIPIDEMIA TYPE: ICD-10-CM

## 2022-05-03 DIAGNOSIS — Z72.0 TOBACCO ABUSE: ICD-10-CM

## 2022-05-03 DIAGNOSIS — J44.9 CHRONIC OBSTRUCTIVE PULMONARY DISEASE, UNSPECIFIED COPD TYPE (HCC): ICD-10-CM

## 2022-05-03 PROCEDURE — 99349 HOME/RES VST EST MOD MDM 40: CPT | Performed by: STUDENT IN AN ORGANIZED HEALTH CARE EDUCATION/TRAINING PROGRAM

## 2022-05-03 RX ORDER — ATORVASTATIN CALCIUM 10 MG/1
10 TABLET, FILM COATED ORAL
Qty: 36 TABLET | Refills: 1 | Status: SHIPPED | OUTPATIENT
Start: 2022-05-04

## 2022-05-03 RX ORDER — AMLODIPINE BESYLATE AND BENAZEPRIL HYDROCHLORIDE 10; 20 MG/1; MG/1
1 CAPSULE ORAL DAILY
Qty: 90 CAPSULE | Refills: 1 | Status: SHIPPED | OUTPATIENT
Start: 2022-05-03

## 2022-05-03 ASSESSMENT — ENCOUNTER SYMPTOMS
BACK PAIN: 0
COUGH: 0
CONSTIPATION: 0
ABDOMINAL PAIN: 0
SHORTNESS OF BREATH: 0

## 2022-05-03 NOTE — PROGRESS NOTES
[facility]      5/3/2022    Home visit medically necessary in lieu of an office visit due to:  Developmentally delayed, needs personal assistance of others to leave for appointments. Chief Complaint: Ariane Banks is a 60 y.o. male with chief complaint of   Chief Complaint   Patient presents with    Hypertension       Assessment & Plan      Essential hypertension  Blood pressures reasonably controlled on current regimen. Discussed blood pressure goals in the setting of younger age and minimal comorbidities and the risks of long-term organ damage with prolonged elevated blood pressures. Discussed possibly pursuing tighter control. Sister has previously noted hesitation as the patient lives alone in case he had side effects and she is unable to get him regularly due to this now. At this time, continue amlodipine-benazepril 10-20 mg daily. Hyperlipidemia  Most recent cholesterol panel well controlled in 8/2021. Continue atorvastatin 10 mg 3 times a week. Chronic obstructive pulmonary disease  Previously recorded in chart but family is unsure if this was formally diagnosed. Patient does have a longstanding smoking history. Does not appear to be in exacerbation. Not currently on any inhalers. Monitor symptoms and need to initiate inhalers. Hyperglycemia  Patient with mildly elevated glucose on fasting labs. Awaiting hemoglobin A1c check. Benign prostatic hyperplasia  Per history and previously followed with Dr. Rafal Velasco. Not currently on medications and not endorsing urinary symptoms at this time. PSA 0.41 in 8/2021. Monitor for recurrent symptoms. Tobacco abuse  Patient currently smoking 2 cigarettes/day and previously was smoking more. Patient has tried to quit multiple times. Discussed benefits of cessation and offered a prescription for nicotine replacement. Patient declined assistance with cessation at this time.     Colon cancer screening  Referral to colorectal surgery for screening placed. Follow Up:  Return in about 3 months (around 8/3/2022), or if symptoms worsen or fail to improve. Subjective:     History of Present Illness:  Jovana Navarro is a 60 y.o. male with significant past medical history of essential hypertension, COPD, tobacco use, hyperlipidemia, and and intellectual developmental delay who was evaluated in his home for multiple medical issues. The patient was seen and examined in his home. His sister was not present and he stated he was not expecting a visit, pacing the room for a minute before coming down and sitting. He also attempted to call his sister several times in the visit. He states that he feels well. He is eating and drinking well. He has no pain at this time. He denies any chest pain or shortness of breath. He has no cough. He feels his mood is good and he denies any dysphoria. He continues to smoke about 2 cigarettes/day, with some days smoking less. He is not interested in cutting down further at this time. He is taking his medications and denies any obvious side effects. Denies any specific needs at this time. Education: Completed high school, special education program.  Occupation: [employer]  Spirituality: Adventism, [employer].     HC-POA: Not completed  Living will: Not completed  Code status: Full code      Functional Status (I: Independent, A: Assisted, D: Dependent)  ADLs I A D Notes   Bathing [x]  []  []     Dressing [x]  []  []     Toileting [x]  []  []     Transfers [x]  []  []     Feeding [x]  []  []     Ambulation [x]  []  []  Assistive devices: none   IADLs I A D    Telephone [x]  []  []     Transportation []  []  [x]  Driving safety concerns: NA patient does not drive    Shopping []  []  [x]     Meal prep []  []  [x]     Housework []  [x]  []     Medications [x]  []  []     Finances []  []  [x]           Past Medical History:   Diagnosis Date    Benign prostatic hyperplasia     COPD (chronic obstructive pulmonary disease) ([facility])     Developmental delay     Hard of hearing     Hyperlipidemia     Hypertension     Obesity     Tobacco abuse 04/26/2016       Past Surgical History:   Procedure Laterality Date    INGUINAL HERNIA REPAIR      lap left inguinal hernia repair with mesh       Family History   Problem Relation Age of Onset    Hypotension Mother     Dementia Mother     Diabetes Father     COPD Father     Dementia Father        Social History     Socioeconomic History    Marital status: Single     Spouse name: Not on file    Number of children: Not on file    Years of education: Not on file    Highest education level: Not on file   Occupational History    Not on file   Tobacco Use    Smoking status: Current Some Day Smoker     Packs/day: 0.12     Years: 35.00     Pack years: 4.20     Types: Cigarettes    Smokeless tobacco: Never Used   Substance and Sexual Activity    Alcohol use: No    Drug use: No    Sexual activity: Not on file   Other Topics Concern    Not on file   Social History Narrative    Not on file     Social Determinants of Health     Financial Resource Strain:     Difficulty of Paying Living Expenses: Not on file   Food Insecurity:     Worried About Running Out of Food in the Last Year: Not on file    Jeff of Food in the Last Year: Not on file   Transportation Needs:     Lack of Transportation (Medical): Not on file    Lack of Transportation (Non-Medical):  Not on file   Physical Activity:     Days of Exercise per Week: Not on file    Minutes of Exercise per Session: Not on file   Stress:     Feeling of Stress : Not on file   Social Connections:     Frequency of Communication with Friends and Family: Not on file    Frequency of Social Gatherings with Friends and Family: Not on file    Attends Anglican Services: Not on file    Active Member of Clubs or Organizations: Not on file    Attends Club or Organization Meetings: Not on file    Marital Status: Not on file   Intimate Partner Violence:     Fear of Current or Ex-Partner: Not on file    Emotionally Abused: Not on file    Physically Abused: Not on file    Sexually Abused: Not on file   Housing Stability:     Unable to Pay for Housing in the Last Year: Not on file    Number of [place] in the Last Year: Not on file    Unstable Housing in the Last Year: Not on file       Current Medications:  Medications reviewed: yes    Current Outpatient Medications on File Prior to Visit   Medication Sig Dispense Refill    atorvastatin (LIPITOR) 10 MG tablet Take 1 tablet by mouth three times a week (Monday, Wednesday, Friday) 36 tablet 1    amLODIPine-benazepril (LOTREL) 10-20 MG per capsule Take 1 capsule by mouth daily 90 capsule 1    aspirin 81 MG tablet Take 81 mg by mouth daily       No current facility-administered medications on file prior to visit. Review of Systems   Constitutional:  Negative for appetite change and fever. Respiratory:  Negative for cough and shortness of breath. Cardiovascular:  Negative for chest pain. Gastrointestinal:  Negative for abdominal pain and constipation. Genitourinary:  Negative for difficulty urinating and dysuria. Musculoskeletal:  Negative for arthralgias and back pain. Neurological:  Negative for light-headedness and headaches. Psychiatric/Behavioral:  Negative for dysphoric mood. The patient is not nervous/anxious. Objective:     Physical Exam  BP (!) 138/92   Pulse 84   Temp 98.2 °F (36.8 °C)   SpO2 95%     Physical Exam  Vitals reviewed. Constitutional:       General: He is not in acute distress. Appearance: He is well-developed. He is not ill-appearing. HENT:      Head: Normocephalic and atraumatic. Neck:      Thyroid: No thyromegaly. Cardiovascular:      Rate and Rhythm: Normal rate and regular rhythm. Pulses: Normal pulses. Heart sounds: Normal heart sounds.    Pulmonary:      Effort: Pulmonary effort is normal. Breath sounds: Normal breath sounds. No wheezing. Abdominal:      General: Bowel sounds are normal. There is no distension. Palpations: Abdomen is soft. Tenderness: There is no abdominal tenderness. Musculoskeletal:      Right lower leg: No edema. Left lower leg: No edema. Lymphadenopathy:      Cervical: No cervical adenopathy. Skin:     General: Skin is warm and dry. Neurological:      General: No focal deficit present. Mental Status: He is alert. Mental status is at baseline. Psychiatric:         Attention and Perception: Attention normal.         Mood and Affect: Mood and affect normal.         Speech: Speech normal.         Behavior: Behavior normal. Behavior is cooperative. Recent Labs:    Lab Results   Component Value Date    WBC 9.7 08/17/2021    HGB 14.3 08/17/2021    HCT 43.2 08/17/2021    MCV 87.6 08/17/2021     08/17/2021    LYMPHOPCT 28.0 08/17/2021    RBC 4.93 08/17/2021    MCH 29.0 08/17/2021    MCHC 33.1 08/17/2021    RDW 13.6 08/17/2021       Lab Results   Component Value Date     08/17/2021    K 4.1 08/17/2021     08/17/2021    CO2 25 08/17/2021    BUN 10 08/17/2021    CREATININE 1.1 08/17/2021    GLUCOSE 115 (H) 08/17/2021    CALCIUM 9.6 08/17/2021    PROT 7.6 08/17/2021    LABALBU 3.9 08/17/2021    BILITOT 0.5 08/17/2021    ALKPHOS 83 08/17/2021    AST 24 08/17/2021    ALT 33 08/17/2021    LABGLOM >60 08/17/2021    GFRAA >60 08/17/2021       No results found for: VITD25, TSH    No results found for: LABA1C    No results found for: INR, PROTIME       Jami Leblanc MD  5/3/2022    This report was generated using a computer driven dictation system. This system may results in transcription errors. Every effort is made to identify and correct errors, however errors may still occur.

## 2022-08-03 ENCOUNTER — TELEPHONE (OUTPATIENT)
Dept: PRIMARY CARE CLINIC | Age: 62
End: 2022-08-03

## 2022-08-03 ENCOUNTER — OFFICE VISIT (OUTPATIENT)
Dept: PRIMARY CARE CLINIC | Age: 62
End: 2022-08-03
Payer: COMMERCIAL

## 2022-08-03 VITALS
BODY MASS INDEX: 30.01 KG/M2 | WEIGHT: 198 LBS | HEART RATE: 78 BPM | RESPIRATION RATE: 20 BRPM | DIASTOLIC BLOOD PRESSURE: 84 MMHG | SYSTOLIC BLOOD PRESSURE: 124 MMHG | OXYGEN SATURATION: 94 % | TEMPERATURE: 98.3 F | HEIGHT: 68 IN

## 2022-08-03 DIAGNOSIS — N40.0 BENIGN PROSTATIC HYPERPLASIA WITHOUT LOWER URINARY TRACT SYMPTOMS: ICD-10-CM

## 2022-08-03 DIAGNOSIS — R73.9 HYPERGLYCEMIA: ICD-10-CM

## 2022-08-03 DIAGNOSIS — F79 INTELLECTUAL DISABILITY: ICD-10-CM

## 2022-08-03 DIAGNOSIS — I10 PRIMARY HYPERTENSION: Primary | ICD-10-CM

## 2022-08-03 DIAGNOSIS — E78.5 HYPERLIPIDEMIA, UNSPECIFIED HYPERLIPIDEMIA TYPE: ICD-10-CM

## 2022-08-03 DIAGNOSIS — Z11.59 NEED FOR HEPATITIS C SCREENING TEST: ICD-10-CM

## 2022-08-03 PROCEDURE — 99349 HOME/RES VST EST MOD MDM 40: CPT | Performed by: FAMILY MEDICINE

## 2022-08-03 SDOH — ECONOMIC STABILITY: FOOD INSECURITY: WITHIN THE PAST 12 MONTHS, THE FOOD YOU BOUGHT JUST DIDN'T LAST AND YOU DIDN'T HAVE MONEY TO GET MORE.: NEVER TRUE

## 2022-08-03 SDOH — ECONOMIC STABILITY: FOOD INSECURITY: WITHIN THE PAST 12 MONTHS, YOU WORRIED THAT YOUR FOOD WOULD RUN OUT BEFORE YOU GOT MONEY TO BUY MORE.: NEVER TRUE

## 2022-08-03 ASSESSMENT — PATIENT HEALTH QUESTIONNAIRE - PHQ9
SUM OF ALL RESPONSES TO PHQ QUESTIONS 1-9: 0
SUM OF ALL RESPONSES TO PHQ QUESTIONS 1-9: 0
1. LITTLE INTEREST OR PLEASURE IN DOING THINGS: 0
SUM OF ALL RESPONSES TO PHQ QUESTIONS 1-9: 0
2. FEELING DOWN, DEPRESSED OR HOPELESS: 0
SUM OF ALL RESPONSES TO PHQ QUESTIONS 1-9: 0
SUM OF ALL RESPONSES TO PHQ9 QUESTIONS 1 & 2: 0

## 2022-08-03 ASSESSMENT — SOCIAL DETERMINANTS OF HEALTH (SDOH): HOW HARD IS IT FOR YOU TO PAY FOR THE VERY BASICS LIKE FOOD, HOUSING, MEDICAL CARE, AND HEATING?: NOT HARD AT ALL

## 2022-08-03 NOTE — PROGRESS NOTES
8/3/2022    Home visit medically necessary in lieu of an office visit due to: Intellectually challenged, homebound, very difficult to get out of house. Seen with sister Kayleigh Blank. HPI:  Patient says he is doing fine and does not have any current problems. Sister also says he has not had any health issues over the last few months. He is taking his meds daily. He smokes 2 cigs daily but has not had any respiratory problems. REVIEW OF SYSTEMS:    GENERAL: Appetite good. WEIGHT UP, generally healthy, no change in strength or exercise tolerance. CARDIOVASCULAR: No edema, no chest pains, no murmurs, no palpitations, no syncope, no orthopnea. RESPIRATORY: No shortness of breath, no pain with breathing, no wheezing, no hemoptysis, no cough. GASTROINTESTINAL: No change in appetite, no dysphagia, no heartburn, no abdominal pains, no constipation or diarrhea, no bowel habit changes, no emesis, no melena, no hemorrhoids. GENITOURINARY: No incontinence or retention, no urinary urgency, no nocturia, no frequent UTIs, no dysuria, no change in nature of urine. MUSCULOSKELETAL: No pain in muscles or joints, no swelling or redness of joints, no limitation of range of motion, no weakness or numbness. NEURO/PSYCH: No weakness, no tremor, no seizures, no changes in mentation, no ataxia. No depressive symptoms, no changes in sleep habits, no changes in thought content. All other systems negative.     No Known Allergies    Past Medical History:   Diagnosis Date    Benign prostatic hyperplasia     COPD (chronic obstructive pulmonary disease) (HCC)     Developmental delay     Hard of hearing     Hyperlipidemia     Hypertension     Obesity     Tobacco abuse 04/26/2016     Past Surgical History:   Procedure Laterality Date    INGUINAL HERNIA REPAIR      lap left inguinal hernia repair with mesh     Social History     Socioeconomic History    Marital status: Single     Spouse name: Not on file    Number of wheezes. HEART: RRR, no murmurs or gallops. ABD: obese, soft, non-tender to palp, no palp masses or HSM, normal BS. BACK:  no scoliosis or kyphosis, non-tender to palp. EXTREMITIES:  No edema, no ulcerations, varicosities or erythema. No gross deformities. MUSCULOSKELETAL:  Good ROM of all joints, non tender to palp and or with movement. SKIN:  No ulcerations or breakdown, rash, ecchymosis, or other lesions. NEURO:   No tremor, motor UEs 5/5 LEs  5/5, sensory normal, able to stand and walk, no ataxia. PSYCH:  Pleasant and cooperative. Fluid speech, oriented to person, place and time. No delusional statements. Medications reviewed. Labs 8/17/21 [facility] 14/43, GFR>60, lytes nl, LFTs nl    ASSESSMENT:  Elderly male has Tobacco abuse; Primary hypertension; Hard of hearing; Hyperlipidemia; Benign prostatic hyperplasia; and Intellectual disability on their problem list.     Diagnoses attached to this encounter:  Chloe Clark was seen today for new patient, nicotine dependence and obesity. Diagnoses and all orders for this visit:    Primary hypertension  -     CBC with Auto Differential; Future  -     Comprehensive Metabolic Panel; Future  -     TSH; Future    Intellectual disability    Hyperlipidemia, unspecified hyperlipidemia type  -     Lipid Panel; Future    Benign prostatic hyperplasia without lower urinary tract symptoms    Hyperglycemia  -     Hemoglobin A1C; Future    Need for hepatitis C screening test  -     Hepatitis C Antibody; Future     PLAN:  Discussed diet and exercise. Check yearly labs. Continue other meds as per Rx List. Recheck 3 months. 40 minutes spent on visit, 25 minutes involved education/counseling regarding weight, HTN disease processes, treatment options, meds and coordination of care.    Current Outpatient Medications   Medication Sig Dispense Refill    amLODIPine-benazepril (LOTREL) 10-20 MG per capsule Take 1 capsule by mouth daily 90 capsule 1    atorvastatin (LIPITOR) 10 MG tablet

## 2022-11-06 NOTE — PROGRESS NOTES
11/7/2022    Home visit medically necessary in lieu of an office visit due to: Intellectually challenged, homebound, very difficult to get out of house. Seen with sister Vasyl López. HPI:  Patient says he is doing good. He does not have any current problems. Sister also says he has been doing well over the last few months. He is taking his meds daily. He smokes 2 cigs daily but has not had any respiratory problems. REVIEW OF SYSTEMS:    GENERAL: Appetite good. WEIGHT UP, generally healthy, no change in strength or exercise tolerance. CARDIOVASCULAR: No edema, no chest pains, no murmurs, no palpitations, no syncope, no orthopnea. RESPIRATORY: No shortness of breath, no pain with breathing, no wheezing, no hemoptysis, no cough. GASTROINTESTINAL: No change in appetite, no dysphagia, no heartburn, no abdominal pains, no constipation or diarrhea, no bowel habit changes, no emesis, no melena, no hemorrhoids. GENITOURINARY: No incontinence or retention, no urinary urgency, no nocturia, no frequent UTIs, no dysuria, no change in nature of urine. MUSCULOSKELETAL: No pain in muscles or joints, no swelling or redness of joints, no limitation of range of motion, no weakness or numbness. NEURO/PSYCH: No weakness, no tremor, no seizures, no changes in mentation, no ataxia. No depressive symptoms, no changes in sleep habits, no changes in thought content. All other systems negative.     No Known Allergies    Past Medical History:   Diagnosis Date    Benign prostatic hyperplasia     COPD (chronic obstructive pulmonary disease) (HCC)     Developmental delay     Hard of hearing     Hyperlipidemia     Hypertension     Obesity     Tobacco abuse 04/26/2016     Past Surgical History:   Procedure Laterality Date    INGUINAL HERNIA REPAIR      lap left inguinal hernia repair with mesh     Social History     Socioeconomic History    Marital status: Single     Spouse name: Not on file    Number of children: Not on file    Years of education: Not on file    Highest education level: Not on file   Occupational History    Not on file   Tobacco Use    Smoking status: Some Days     Packs/day: 0.12     Years: 35.00     Pack years: 4.20     Types: Cigarettes    Smokeless tobacco: Never   Substance and Sexual Activity    Alcohol use: No    Drug use: No    Sexual activity: Not on file   Other Topics Concern    Not on file   Social History Narrative    Not on file     Social Determinants of Health     Financial Resource Strain: Low Risk     Difficulty of Paying Living Expenses: Not hard at all   Food Insecurity: No Food Insecurity    Worried About Running Out of Food in the Last Year: Never true    Ran Out of Food in the Last Year: Never true   Transportation Needs: Not on file   Physical Activity: Not on file   Stress: Not on file   Social Connections: Not on file   Intimate Partner Violence: Not on file   Housing Stability: Not on file      Family History   Problem Relation Age of Onset    Hypotension Mother     Dementia Mother     Diabetes Father     COPD Father     Dementia Father      PHYSICAL EXAM:   Vitals:    11/07/22 1306   BP: 126/89   Site: Left Upper Arm   Position: Sitting   Pulse: 75   Resp: 18   Temp: 97.3 °F (36.3 °C)   TempSrc: Infrared   SpO2: 99%   Weight: Comment: No weight today   Height: 5' 8\" (1.727 m)     Estimated body mass index is 30.11 kg/m² as calculated from the following:    Height as of this encounter: 5' 8\" (1.727 m). Weight as of 8/3/22: 198 lb (89.8 kg). GEN:  middle aged WDWN male patient seated in chair in NAD. HEAD:  atraumatic, normocephalic. EYES:  EOMI, PERRL, no cataracts, conjunctivae appear normal.  ENT: Fair hearing, EACs without wax, TM's normal, nasal septum midline, no significant congestion, oral cavity without lesions, poor dentition, no dentures. NECK:  fair-good ROM, no palpable masses, no carotid bruits, no JVD. LUNGS:  clear to ausc, no rales, rhonchi or wheezes. HEART: RRR, no murmurs or gallops. ABD: obese, soft, non-tender to palp, no palp masses or HSM, normal BS. BACK:  no scoliosis or kyphosis, non-tender to palp. EXTREMITIES:  No edema, no ulcerations, varicosities or erythema. No gross deformities. MUSCULOSKELETAL:  Good ROM of all joints, non tender to palp and or with movement. SKIN:  No ulcerations or breakdown, rash, ecchymosis, or other lesions. NEURO:   No tremor, motor UEs 5/5 LEs  5/5, sensory normal, able to stand and walk, no ataxia. PSYCH:  Pleasant and cooperative. Fluid speech, oriented to person, place and time. No delusional statements. Medications reviewed. [facility] G[facility]60, lytes nl, LFTs nl    ASSESSMENT:  Elderly male has Tobacco abuse; Primary hypertension; Hard of hearing; Hyperlipidemia; Benign prostatic hyperplasia; and Intellectual disability on their problem list.     Diagnoses attached to this encounter:  Mumtaz Rutledge was seen today for hypertension and flu vaccine. Diagnoses and all orders for this visit:    Primary hypertension    Intellectual disability    Tobacco abuse    Need for influenza vaccination  -     Influenza, FLUCELVAX, (age 10 mo+), IM, Preservative Free, 0.5 mL     PLAN:  Flucelvax vac given L deltoid. Discussed diet and exercise. Check yearly labs. Continue other meds as per Rx List. Recheck 3 months. 40 minutes spent on visit, 25 minutes involved education/counseling regarding weight, HTN disease processes, treatment options, meds and coordination of care. Current Outpatient Medications   Medication Sig Dispense Refill    amLODIPine-benazepril (LOTREL) 10-20 MG per capsule Take 1 capsule by mouth daily 90 capsule 1    atorvastatin (LIPITOR) 10 MG tablet Take 1 tablet by mouth three times a week (Monday, Wednesday, Friday) 36 tablet 1    aspirin 81 MG tablet Take 81 mg by mouth daily       No current facility-administered medications for this visit.       Return in about 3 months (around 2/7/2023). An  electronic signature was used to authenticate this note.     --Lorne Scheuermann, MD on 11/7/2022 at 1:13 PM

## 2022-11-07 ENCOUNTER — OFFICE VISIT (OUTPATIENT)
Dept: PRIMARY CARE CLINIC | Age: 62
End: 2022-11-07
Payer: COMMERCIAL

## 2022-11-07 VITALS
HEART RATE: 75 BPM | HEIGHT: 68 IN | DIASTOLIC BLOOD PRESSURE: 89 MMHG | OXYGEN SATURATION: 99 % | SYSTOLIC BLOOD PRESSURE: 126 MMHG | TEMPERATURE: 97.3 F | RESPIRATION RATE: 18 BRPM | BODY MASS INDEX: 30.11 KG/M2

## 2022-11-07 DIAGNOSIS — I10 PRIMARY HYPERTENSION: Primary | ICD-10-CM

## 2022-11-07 DIAGNOSIS — Z23 NEED FOR INFLUENZA VACCINATION: ICD-10-CM

## 2022-11-07 DIAGNOSIS — Z72.0 TOBACCO ABUSE: ICD-10-CM

## 2022-11-07 DIAGNOSIS — F79 INTELLECTUAL DISABILITY: ICD-10-CM

## 2022-11-07 PROCEDURE — 3074F SYST BP LT 130 MM HG: CPT | Performed by: FAMILY MEDICINE

## 2022-11-07 PROCEDURE — 90674 CCIIV4 VAC NO PRSV 0.5 ML IM: CPT | Performed by: FAMILY MEDICINE

## 2022-11-07 PROCEDURE — 3078F DIAST BP <80 MM HG: CPT | Performed by: FAMILY MEDICINE

## 2022-11-07 PROCEDURE — 90471 IMMUNIZATION ADMIN: CPT | Performed by: FAMILY MEDICINE

## 2022-11-07 PROCEDURE — 99349 HOME/RES VST EST MOD MDM 40: CPT | Performed by: FAMILY MEDICINE

## 2022-12-08 DIAGNOSIS — I10 ESSENTIAL HYPERTENSION: ICD-10-CM

## 2022-12-08 RX ORDER — AMLODIPINE BESYLATE AND BENAZEPRIL HYDROCHLORIDE 10; 20 MG/1; MG/1
CAPSULE ORAL
Qty: 90 CAPSULE | Refills: 3 | Status: SHIPPED | OUTPATIENT
Start: 2022-12-08

## 2022-12-15 DIAGNOSIS — E78.5 HYPERLIPIDEMIA, UNSPECIFIED HYPERLIPIDEMIA TYPE: ICD-10-CM

## 2022-12-15 RX ORDER — ATORVASTATIN CALCIUM 10 MG/1
10 TABLET, FILM COATED ORAL
Qty: 36 TABLET | Refills: 3 | Status: SHIPPED | OUTPATIENT
Start: 2022-12-16

## 2023-02-20 ENCOUNTER — HOSPITAL ENCOUNTER (OUTPATIENT)
Age: 63
Discharge: HOME | End: 2023-02-20
Payer: COMMERCIAL

## 2023-02-20 DIAGNOSIS — R73.9 HYPERGLYCEMIA: ICD-10-CM

## 2023-02-20 DIAGNOSIS — E78.5 HYPERLIPIDEMIA, UNSPECIFIED HYPERLIPIDEMIA TYPE: ICD-10-CM

## 2023-02-20 DIAGNOSIS — Z11.59 NEED FOR HEPATITIS C SCREENING TEST: ICD-10-CM

## 2023-02-20 DIAGNOSIS — I10 PRIMARY HYPERTENSION: ICD-10-CM

## 2023-02-20 LAB
ALBUMIN SERPL-MCNC: 4 G/DL (ref 3.5–5.2)
ALP BLD-CCNC: 81 U/L (ref 40–129)
ALT SERPL-CCNC: 27 U/L (ref 0–40)
ANION GAP SERPL CALCULATED.3IONS-SCNC: 10 MMOL/L (ref 7–16)
AST SERPL-CCNC: 23 U/L (ref 0–39)
BASOPHILS ABSOLUTE: 0.04 E9/L (ref 0–0.2)
BASOPHILS RELATIVE PERCENT: 0.4 % (ref 0–2)
BILIRUB SERPL-MCNC: 0.5 MG/DL (ref 0–1.2)
BUN BLDV-MCNC: 9 MG/DL (ref 6–23)
CALCIUM SERPL-MCNC: 9.3 MG/DL (ref 8.6–10.2)
CHLORIDE BLD-SCNC: 104 MMOL/L (ref 98–107)
CHOLESTEROL, TOTAL: 115 MG/DL (ref 0–199)
CO2: 23 MMOL/L (ref 22–29)
CREAT SERPL-MCNC: 1.2 MG/DL (ref 0.7–1.2)
EOSINOPHILS ABSOLUTE: 0.19 E9/L (ref 0.05–0.5)
EOSINOPHILS RELATIVE PERCENT: 1.9 % (ref 0–6)
GFR SERPL CREATININE-BSD FRML MDRD: >60 ML/MIN/1.73
GLUCOSE BLD-MCNC: 112 MG/DL (ref 74–99)
HBA1C MFR BLD: 6 % (ref 4–5.6)
HCT VFR BLD CALC: 44 % (ref 37–54)
HDLC SERPL-MCNC: 43 MG/DL
HEMOGLOBIN: 14.7 G/DL (ref 12.5–16.5)
IMMATURE GRANULOCYTES #: 0.02 E9/L
IMMATURE GRANULOCYTES %: 0.2 % (ref 0–5)
LDL CHOLESTEROL CALCULATED: 50 MG/DL (ref 0–99)
LYMPHOCYTES ABSOLUTE: 2.52 E9/L (ref 1.5–4)
LYMPHOCYTES RELATIVE PERCENT: 25.7 % (ref 20–42)
MCH RBC QN AUTO: 29.1 PG (ref 26–35)
MCHC RBC AUTO-ENTMCNC: 33.4 % (ref 32–34.5)
MCV RBC AUTO: 87 FL (ref 80–99.9)
MONOCYTES ABSOLUTE: 0.67 E9/L (ref 0.1–0.95)
MONOCYTES RELATIVE PERCENT: 6.8 % (ref 2–12)
NEUTROPHILS ABSOLUTE: 6.35 E9/L (ref 1.8–7.3)
NEUTROPHILS RELATIVE PERCENT: 65 % (ref 43–80)
PDW BLD-RTO: 13.4 FL (ref 11.5–15)
PLATELET # BLD: 309 E9/L (ref 130–450)
PMV BLD AUTO: 9 FL (ref 7–12)
POTASSIUM SERPL-SCNC: 4 MMOL/L (ref 3.5–5)
RBC # BLD: 5.06 E12/L (ref 3.8–5.8)
SODIUM BLD-SCNC: 137 MMOL/L (ref 132–146)
TOTAL PROTEIN: 7.1 G/DL (ref 6.4–8.3)
TRIGL SERPL-MCNC: 109 MG/DL (ref 0–149)
TSH SERPL DL<=0.05 MIU/L-ACNC: 2.74 UIU/ML (ref 0.27–4.2)
VLDLC SERPL CALC-MCNC: 22 MG/DL
WBC # BLD: 9.8 E9/L (ref 4.5–11.5)

## 2023-02-20 PROCEDURE — 86803 HEPATITIS C AB TEST: CPT

## 2023-02-20 PROCEDURE — 85025 COMPLETE CBC W/AUTO DIFF WBC: CPT

## 2023-02-20 PROCEDURE — 80053 COMPREHEN METABOLIC PANEL: CPT

## 2023-02-20 PROCEDURE — 83036 HEMOGLOBIN GLYCOSYLATED A1C: CPT

## 2023-02-20 PROCEDURE — 36415 COLL VENOUS BLD VENIPUNCTURE: CPT

## 2023-02-20 PROCEDURE — 80061 LIPID PANEL: CPT

## 2023-02-20 PROCEDURE — 84443 ASSAY THYROID STIM HORMONE: CPT

## 2023-02-20 NOTE — PROGRESS NOTES
2/21/2023    Home visit medically necessary in lieu of an office visit due to: Intellectually challenged, homebound, very difficult to get out of house. Seen with sister [address]. HPI:  Patient says he has been doing well. He has not had any recent problems. Sister says he has been doing well over the last few months. He takes his meds daily. He has been watching his diet better and drinks only water. He smokes 2 cigs/day but has not had any respiratory problems. REVIEW OF SYSTEMS:    GENERAL: Appetite good. WEIGHT UP, generally healthy, no change in strength or exercise tolerance. CARDIOVASCULAR: No edema, no chest pains, no murmurs, no palpitations, no syncope, no orthopnea. RESPIRATORY: No shortness of breath, no pain with breathing, no wheezing, no hemoptysis, no cough. GASTROINTESTINAL: No change in appetite, no dysphagia, no heartburn, no abdominal pains, no constipation or diarrhea, no bowel habit changes, no emesis, no melena, no hemorrhoids. GENITOURINARY: No incontinence or retention, no urinary urgency, no nocturia, no frequent UTIs, no dysuria, no change in nature of urine. MUSCULOSKELETAL: No pain in muscles or joints, no swelling or redness of joints, no limitation of range of motion, no weakness or numbness. NEURO/PSYCH: No weakness, no tremor, no seizures, no changes in mentation, no ataxia. No depressive symptoms, no changes in sleep habits, no changes in thought content. All other systems negative.     No Known Allergies    Past Medical History:   Diagnosis Date    Benign prostatic hyperplasia     COPD (chronic obstructive pulmonary disease) ([facility])     Developmental delay     Hard of hearing     Hyperlipidemia     Hypertension     Obesity     Tobacco abuse 04/26/2016     Past Surgical History:   Procedure Laterality Date    INGUINAL HERNIA REPAIR      lap left inguinal hernia repair with mesh     Social History     Socioeconomic History    Marital status: Single Spouse name: Not on file    Number of children: Not on file    Years of education: Not on file    Highest education level: Not on file   Occupational History    Not on file   Tobacco Use    Smoking status: Some Days     Packs/day: 0.12     Years: 35.00     Pack years: 4.20     Types: Cigarettes    Smokeless tobacco: Never   Substance and Sexual Activity    Alcohol use: No    Drug use: No    Sexual activity: Not on file   Other Topics Concern    Not on file   Social History Narrative    Not on file     Social Determinants of Health     Financial Resource Strain: Low Risk     Difficulty of Paying Living Expenses: Not hard at all   Food Insecurity: No Food Insecurity    Worried About [address] in the Last Year: Never true    [address] in the Last Year: Never true   Transportation Needs: Unknown    Lack of Transportation (Medical): Not on file    Lack of Transportation (Non-Medical): No   Physical Activity: Not on file   Stress: Not on file   Social Connections: Not on file   Intimate Partner Violence: Not on file   Housing Stability: Unknown    Unable to Pay for Housing in the Last Year: Not on file    Number of Places Lived in the Last Year: Not on file    Unstable Housing in the Last Year: No      Family History   Problem Relation Age of Onset    Hypotension Mother     Dementia Mother     Diabetes Father     COPD Father     Dementia Father      PHYSICAL EXAM:   Vitals:    02/21/23 1309   BP: 125/87   Pulse: 87   Resp: 18   Temp: 97.3 °F (36.3 °C)   TempSrc: Infrared   SpO2: 98%   Weight: 192 lb 6.4 oz (87.3 kg)   Height: 5' 8\" (1.727 m)     Estimated body mass index is 29.25 kg/m² as calculated from the following:    Height as of this encounter: 5' 8\" (1.727 m). Weight as of this encounter: 192 lb 6.4 oz (87.3 kg). GEN:  middle aged WDWN male patient seated in chair in NAD. HEAD:  atraumatic, normocephalic.   EYES:  EOMI, PERRL, no cataracts, conjunctivae appear normal.  ENT: Fair hearing, EACs without wax, TM's normal, nasal septum midline, no significant congestion, oral cavity without lesions, poor dentition, no dentures. NECK:  fair-good ROM, no palpable masses, no carotid bruits, no JVD. LUNGS:  clear to ausc, no rales, rhonchi or wheezes. HEART: RRR, no murmurs or gallops. ABD: obese, soft, non-tender to palp, no palp masses or HSM, normal BS. BACK:  no scoliosis or kyphosis, non-tender to palp. EXTREMITIES:  No edema, no ulcerations, varicosities or erythema. No gross deformities. MUSCULOSKELETAL:  Good ROM of all joints, non tender to palp and or with movement. SKIN:  No ulcerations or breakdown, rash, ecchymosis, or other lesions. NEURO:   No tremor, motor UEs 5/5 LEs  5/5, sensory normal, able to stand and walk, no ataxia. PSYCH:  Pleasant and cooperative. Fluid speech, oriented to person, place and time. No delusional statements. Medications reviewed. Labs 2/20/23 [facility] 15/44, GFR>60, lytes nl, LFTs nl, TSH 2.74, , HDL 43, A1c 6.0  8/17/21 [facility] 14/43, GFR>60, lytes nl, LFTs nl    ASSESSMENT:  Elderly male has Tobacco abuse; Primary hypertension; Hard of hearing; Hyperlipidemia; Benign prostatic hyperplasia; Intellectual disability; and Diet-controlled diabetes mellitus ([address] on their problem list.     Diagnoses attached to this encounter:  Anthony Kelley was seen today for hypertension and nicotine dependence. Diagnoses and all orders for this visit:    Primary hypertension    Hyperlipidemia, unspecified hyperlipidemia type    Intellectual disability    Diet-controlled diabetes mellitus ([address]     PLAN:  Discussed diet and exercise. Check yearly labs in Feb 2024. Continue other meds as per Rx List. Recheck 3 months. 40 minutes spent on visit, 25 minutes involved education/counseling regarding weight, HTN disease processes, treatment options, meds and coordination of care.    Current Outpatient Medications   Medication Sig Dispense Refill    atorvastatin (LIPITOR) 10 MG tablet TAKE 1 TABLET BY MOUTH THREE TIMES A WEEK (MONDAY, WEDNESDAY, FRIDAY) 36 tablet 3    amLODIPine-benazepril (LOTREL) 10-20 MG per capsule TAKE 1 CAPSULE BY MOUTH EVERY DAY 90 capsule 3    aspirin 81 MG tablet Take 81 mg by mouth daily       No current facility-administered medications for this visit. Return in about 3 months (around 5/21/2023). An  electronic signature was used to authenticate this note.     --Parth Severino MD on 2/21/2023 at 1:23 PM

## 2023-02-21 ENCOUNTER — OFFICE VISIT (OUTPATIENT)
Dept: PRIMARY CARE CLINIC | Age: 63
End: 2023-02-21
Payer: COMMERCIAL

## 2023-02-21 VITALS
RESPIRATION RATE: 18 BRPM | SYSTOLIC BLOOD PRESSURE: 125 MMHG | DIASTOLIC BLOOD PRESSURE: 87 MMHG | HEART RATE: 87 BPM | OXYGEN SATURATION: 98 % | HEIGHT: 68 IN | WEIGHT: 192.4 LBS | BODY MASS INDEX: 29.16 KG/M2 | TEMPERATURE: 97.3 F

## 2023-02-21 DIAGNOSIS — E11.9 DIET-CONTROLLED DIABETES MELLITUS (HCC): ICD-10-CM

## 2023-02-21 DIAGNOSIS — E78.5 HYPERLIPIDEMIA, UNSPECIFIED HYPERLIPIDEMIA TYPE: ICD-10-CM

## 2023-02-21 DIAGNOSIS — I10 PRIMARY HYPERTENSION: Primary | ICD-10-CM

## 2023-02-21 DIAGNOSIS — F79 INTELLECTUAL DISABILITY: ICD-10-CM

## 2023-02-21 PROCEDURE — 99349 HOME/RES VST EST MOD MDM 40: CPT | Performed by: FAMILY MEDICINE

## 2023-02-21 PROCEDURE — 3044F HG A1C LEVEL LT 7.0%: CPT | Performed by: FAMILY MEDICINE

## 2023-02-21 PROCEDURE — 3074F SYST BP LT 130 MM HG: CPT | Performed by: FAMILY MEDICINE

## 2023-02-21 PROCEDURE — 3079F DIAST BP 80-89 MM HG: CPT | Performed by: FAMILY MEDICINE

## 2023-02-21 SDOH — ECONOMIC STABILITY: HOUSING INSECURITY
IN THE LAST 12 MONTHS, WAS THERE A TIME WHEN YOU DID NOT HAVE A STEADY PLACE TO SLEEP OR SLEPT IN A SHELTER (INCLUDING NOW)?: NO

## 2023-02-21 SDOH — ECONOMIC STABILITY: INCOME INSECURITY: HOW HARD IS IT FOR YOU TO PAY FOR THE VERY BASICS LIKE FOOD, HOUSING, MEDICAL CARE, AND HEATING?: NOT HARD AT ALL

## 2023-02-21 SDOH — ECONOMIC STABILITY: FOOD INSECURITY: WITHIN THE PAST 12 MONTHS, THE FOOD YOU BOUGHT JUST DIDN'T LAST AND YOU DIDN'T HAVE MONEY TO GET MORE.: NEVER TRUE

## 2023-02-21 SDOH — ECONOMIC STABILITY: FOOD INSECURITY: WITHIN THE PAST 12 MONTHS, YOU WORRIED THAT YOUR FOOD WOULD RUN OUT BEFORE YOU GOT MONEY TO BUY MORE.: NEVER TRUE

## 2023-02-21 ASSESSMENT — PATIENT HEALTH QUESTIONNAIRE - PHQ9
SUM OF ALL RESPONSES TO PHQ QUESTIONS 1-9: 0
2. FEELING DOWN, DEPRESSED OR HOPELESS: 0
1. LITTLE INTEREST OR PLEASURE IN DOING THINGS: 0
SUM OF ALL RESPONSES TO PHQ9 QUESTIONS 1 & 2: 0
SUM OF ALL RESPONSES TO PHQ QUESTIONS 1-9: 0

## 2023-02-24 ENCOUNTER — TELEPHONE (OUTPATIENT)
Dept: PRIMARY CARE CLINIC | Age: 63
End: 2023-02-24

## 2023-02-24 DIAGNOSIS — R76.8 HEPATITIS C ANTIBODY TEST POSITIVE: Primary | ICD-10-CM

## 2023-02-24 LAB — HEPATITIS C ANTIBODY INTERPRETATION: REACTIVE

## 2023-02-24 NOTE — TELEPHONE ENCOUNTER
Please let Tatiana Duc know that since he tested positive for Hepatitis C antibody, Abner Adrian will need another blood test to see if he has active hepatitis C infection. I put the order in.

## 2023-02-24 NOTE — TELEPHONE ENCOUNTER
Spoke to Geovanny and gave instructions. Verbalized understanding.  State she is aware that he had Hepatitis C in the past.

## 2023-02-27 ENCOUNTER — HOSPITAL ENCOUNTER (OUTPATIENT)
Age: 63
Discharge: HOME | End: 2023-02-27
Payer: COMMERCIAL

## 2023-02-27 DIAGNOSIS — R76.8 HEPATITIS C ANTIBODY TEST POSITIVE: ICD-10-CM

## 2023-02-27 PROCEDURE — 87522 HEPATITIS C REVRS TRNSCRPJ: CPT

## 2023-03-02 LAB
HCV QNT BY NAAT IU/ML: ABNORMAL IU/ML
HCV QNT BY NAAT LOG IU/ML: 6.66 LOG IU/ML
INTERPRETATION: DETECTED

## 2023-03-06 ENCOUNTER — TELEPHONE (OUTPATIENT)
Dept: PRIMARY CARE CLINIC | Age: 63
End: 2023-03-06

## 2023-03-06 DIAGNOSIS — B18.2 CHRONIC HEPATITIS C WITHOUT HEPATIC COMA (HCC): ICD-10-CM

## 2023-03-06 NOTE — TELEPHONE ENCOUNTER
I called Harvey Like and recommend referral to ID. She understands that it is not an emergency. I put the referral in the system.

## 2023-03-06 NOTE — TELEPHONE ENCOUNTER
Sister Zita Newmans called. She is asking about the results of the additional Hepatitis labs that Gaudencio Calero had done.

## 2023-03-07 ENCOUNTER — TELEPHONE (OUTPATIENT)
Dept: PRIMARY CARE CLINIC | Age: 63
End: 2023-03-07

## 2023-03-07 DIAGNOSIS — B18.2 CHRONIC HEPATITIS C WITHOUT HEPATIC COMA (HCC): Primary | ICD-10-CM

## 2023-03-07 NOTE — TELEPHONE ENCOUNTER
[facility]. They gave us fax for [address]  which does. Can you put new referral in for [facility]?  Thanks

## 2023-03-08 ENCOUNTER — TELEPHONE (OUTPATIENT)
Dept: PRIMARY CARE CLINIC | Age: 63
End: 2023-03-08

## 2023-03-08 NOTE — TELEPHONE ENCOUNTER
Servando Styles called to inform the practice that they will not be following up on the referral for the treatment of Hepatis C. The family will continue to monitor it as suggested.

## 2023-05-30 ENCOUNTER — OFFICE VISIT (OUTPATIENT)
Dept: PRIMARY CARE CLINIC | Age: 63
End: 2023-05-30
Payer: COMMERCIAL

## 2023-05-30 VITALS
WEIGHT: 189.6 LBS | HEIGHT: 68 IN | HEART RATE: 108 BPM | OXYGEN SATURATION: 97 % | DIASTOLIC BLOOD PRESSURE: 86 MMHG | RESPIRATION RATE: 16 BRPM | BODY MASS INDEX: 28.73 KG/M2 | SYSTOLIC BLOOD PRESSURE: 129 MMHG

## 2023-05-30 DIAGNOSIS — R00.0 TACHYCARDIA: ICD-10-CM

## 2023-05-30 DIAGNOSIS — I10 PRIMARY HYPERTENSION: Primary | ICD-10-CM

## 2023-05-30 DIAGNOSIS — E11.9 DIET-CONTROLLED DIABETES MELLITUS (HCC): ICD-10-CM

## 2023-05-30 DIAGNOSIS — F79 INTELLECTUAL DISABILITY: ICD-10-CM

## 2023-05-30 PROCEDURE — 3044F HG A1C LEVEL LT 7.0%: CPT | Performed by: FAMILY MEDICINE

## 2023-05-30 PROCEDURE — 3074F SYST BP LT 130 MM HG: CPT | Performed by: FAMILY MEDICINE

## 2023-05-30 PROCEDURE — 3079F DIAST BP 80-89 MM HG: CPT | Performed by: FAMILY MEDICINE

## 2023-05-30 PROCEDURE — 99349 HOME/RES VST EST MOD MDM 40: CPT | Performed by: FAMILY MEDICINE

## 2023-05-30 NOTE — PROGRESS NOTES
hearing, EACs without wax, TM's normal, nasal septum midline, no significant congestion, oral cavity without lesions, poor dentition, no dentures. NECK:  fair-good ROM, no palpable masses, no carotid bruits, no JVD. LUNGS:  clear to ausc, no rales, rhonchi or wheezes. HEART: RRR, no murmurs or gallops. ABD: obese, soft, non-tender to palp, no palp masses or HSM, normal BS. BACK:  no scoliosis or kyphosis, non-tender to palp. EXTREMITIES:  No edema, no ulcerations, varicosities or erythema. No gross deformities. MUSCULOSKELETAL:  Good ROM of all joints, non tender to palp and or with movement. SKIN:  No ulcerations or breakdown, rash, ecchymosis, or other lesions. NEURO:   No tremor, motor UEs 5/5 LEs  5/5, sensory normal, able to stand and walk, no ataxia. PSYCH:  Pleasant and cooperative. Fluid speech, oriented to person, place and time. No delusional statements. Medications reviewed. Labs 2/20/23 HH 15/44, GFR>60, lytes nl, LFTs nl, TSH 2.74, , HDL 43, A1c 6.0  8/17/21 HH 14/43, GFR>60, lytes nl, LFTs nl    ASSESSMENT:  Elderly male has Tobacco abuse; Primary hypertension; Hard of hearing; Hyperlipidemia; Benign prostatic hyperplasia; Intellectual disability; Diet-controlled diabetes mellitus ([address] and Chronic hepatitis C without hepatic coma ([address] on their problem list.     Diagnoses attached to this encounter:  Lili Wall was seen today for hypertension. Diagnoses and all orders for this visit:    Primary hypertension    Diet-controlled diabetes mellitus ([address]    Intellectual disability    Tachycardia       PLAN:  Soto Obdulia will monitor HR for now and let us know if it doesn't slow down. Discussed diet and exercise. Check yearly labs in Feb 2024. Continue other meds as per Rx List. Recheck 3 months. 40 minutes spent on visit, 25 minutes involved education/counseling regarding weight, HTN disease processes, treatment options, meds and coordination of care.    Current Outpatient Medications

## 2023-08-31 ENCOUNTER — OFFICE VISIT (OUTPATIENT)
Dept: PRIMARY CARE CLINIC | Age: 63
End: 2023-08-31
Payer: COMMERCIAL

## 2023-08-31 VITALS
TEMPERATURE: 97.2 F | DIASTOLIC BLOOD PRESSURE: 89 MMHG | OXYGEN SATURATION: 98 % | WEIGHT: 186 LBS | HEART RATE: 82 BPM | RESPIRATION RATE: 18 BRPM | HEIGHT: 68 IN | SYSTOLIC BLOOD PRESSURE: 125 MMHG | BODY MASS INDEX: 28.19 KG/M2

## 2023-08-31 DIAGNOSIS — E11.9 DIET-CONTROLLED DIABETES MELLITUS (HCC): ICD-10-CM

## 2023-08-31 DIAGNOSIS — I10 PRIMARY HYPERTENSION: Primary | ICD-10-CM

## 2023-08-31 DIAGNOSIS — F79 INTELLECTUAL DISABILITY: ICD-10-CM

## 2023-08-31 DIAGNOSIS — B18.2 CHRONIC HEPATITIS C WITHOUT HEPATIC COMA (HCC): ICD-10-CM

## 2023-08-31 PROCEDURE — 3044F HG A1C LEVEL LT 7.0%: CPT | Performed by: FAMILY MEDICINE

## 2023-08-31 PROCEDURE — 3074F SYST BP LT 130 MM HG: CPT | Performed by: FAMILY MEDICINE

## 2023-08-31 PROCEDURE — 99349 HOME/RES VST EST MOD MDM 40: CPT | Performed by: FAMILY MEDICINE

## 2023-08-31 PROCEDURE — 3079F DIAST BP 80-89 MM HG: CPT | Performed by: FAMILY MEDICINE

## 2023-11-30 NOTE — PROGRESS NOTES
exercise. Check yearly labs in Feb 2024. Continue other meds as per Rx List. Recheck 3 months. 40 minutes spent on visit, 25 minutes involved education/counseling regarding weight, HTN disease processes, treatment options, meds and coordination of care. Current Outpatient Medications   Medication Sig Dispense Refill    atorvastatin (LIPITOR) 10 MG tablet TAKE 1 TABLET BY MOUTH THREE TIMES A WEEK (MONDAY, WEDNESDAY, FRIDAY) 36 tablet 3    amLODIPine-benazepril (LOTREL) 10-20 MG per capsule TAKE 1 CAPSULE BY MOUTH EVERY DAY 90 capsule 3    aspirin 81 MG tablet Take 81 mg by mouth daily       No current facility-administered medications for this visit. No follow-ups on file. An  electronic signature was used to authenticate this note.     --Jerry Nelson MD on 12/1/2023 at 11:04 AM

## 2023-12-01 ENCOUNTER — OFFICE VISIT (OUTPATIENT)
Dept: PRIMARY CARE CLINIC | Age: 63
End: 2023-12-01
Payer: COMMERCIAL

## 2023-12-01 VITALS
RESPIRATION RATE: 18 BRPM | BODY MASS INDEX: 28.19 KG/M2 | WEIGHT: 186 LBS | TEMPERATURE: 97.2 F | DIASTOLIC BLOOD PRESSURE: 85 MMHG | SYSTOLIC BLOOD PRESSURE: 121 MMHG | HEART RATE: 90 BPM | HEIGHT: 68 IN | OXYGEN SATURATION: 97 %

## 2023-12-01 DIAGNOSIS — F79 INTELLECTUAL DISABILITY: ICD-10-CM

## 2023-12-01 DIAGNOSIS — E11.9 DIET-CONTROLLED DIABETES MELLITUS (HCC): ICD-10-CM

## 2023-12-01 DIAGNOSIS — B18.2 CHRONIC HEPATITIS C WITHOUT HEPATIC COMA (HCC): ICD-10-CM

## 2023-12-01 DIAGNOSIS — Z23 NEED FOR INFLUENZA VACCINATION: ICD-10-CM

## 2023-12-01 DIAGNOSIS — I10 PRIMARY HYPERTENSION: Primary | ICD-10-CM

## 2023-12-01 PROCEDURE — 3044F HG A1C LEVEL LT 7.0%: CPT | Performed by: FAMILY MEDICINE

## 2023-12-01 PROCEDURE — 90674 CCIIV4 VAC NO PRSV 0.5 ML IM: CPT | Performed by: FAMILY MEDICINE

## 2023-12-01 PROCEDURE — 3074F SYST BP LT 130 MM HG: CPT | Performed by: FAMILY MEDICINE

## 2023-12-01 PROCEDURE — 99349 HOME/RES VST EST MOD MDM 40: CPT | Performed by: FAMILY MEDICINE

## 2023-12-01 PROCEDURE — 90471 IMMUNIZATION ADMIN: CPT | Performed by: FAMILY MEDICINE

## 2023-12-01 PROCEDURE — 3079F DIAST BP 80-89 MM HG: CPT | Performed by: FAMILY MEDICINE

## 2023-12-03 DIAGNOSIS — I10 ESSENTIAL HYPERTENSION: ICD-10-CM

## 2023-12-04 RX ORDER — AMLODIPINE BESYLATE AND BENAZEPRIL HYDROCHLORIDE 10; 20 MG/1; MG/1
CAPSULE ORAL
Qty: 90 CAPSULE | Refills: 3 | Status: SHIPPED | OUTPATIENT
Start: 2023-12-04

## 2023-12-07 DIAGNOSIS — E78.5 HYPERLIPIDEMIA, UNSPECIFIED HYPERLIPIDEMIA TYPE: ICD-10-CM

## 2023-12-07 RX ORDER — ATORVASTATIN CALCIUM 10 MG/1
10 TABLET, FILM COATED ORAL
Qty: 36 TABLET | Refills: 3 | Status: SHIPPED | OUTPATIENT
Start: 2023-12-08

## 2024-05-27 NOTE — PROGRESS NOTES
5/28/2024    Chief Complaint   Patient presents with    Hypertension     HPI:  Seen with sister Shilpa. Patient says he has been doing good. He has not had any recent illness or health problems. Sister says he has been doing good over the last few months. He takes his meds daily. He has been watching his diet and drinks only water. He smokes 2 cigs/day and he's trying to quit. He has not had any respiratory or heart symptoms.    REVIEW OF SYSTEMS:    GENERAL: Appetite good.  WEIGHT UP, generally healthy, no change in strength or exercise tolerance.  CARDIOVASCULAR: No edema, no chest pains, no murmurs, no palpitations, no syncope, no orthopnea.      RESPIRATORY: No shortness of breath, no pain with breathing, no wheezing, no hemoptysis, no cough.    GASTROINTESTINAL: No change in appetite, no dysphagia, no heartburn, no abdominal pains, no constipation or diarrhea, no bowel habit changes, no emesis, no melena, no hemorrhoids.       GENITOURINARY: No incontinence or retention, no urinary urgency, no nocturia, no frequent UTIs, no dysuria, no change in nature of urine.    MUSCULOSKELETAL: No pain in muscles or joints, no swelling or redness of joints, no limitation of range of motion, no weakness or numbness.       NEURO/PSYCH: No weakness, no tremor, no seizures, no changes in mentation, no ataxia. No depressive symptoms, no changes in sleep habits, no changes in thought content.       All other systems negative.    No Known Allergies    Past Medical History:   Diagnosis Date    Benign prostatic hyperplasia     COPD (chronic obstructive pulmonary disease) (HCC)     Developmental delay     Hard of hearing     Hyperlipidemia     Hypertension     Obesity     Tobacco abuse 04/26/2016     Past Surgical History:   Procedure Laterality Date    INGUINAL HERNIA REPAIR      lap left inguinal hernia repair with mesh     Social History     Socioeconomic History    Marital status: Single     Spouse name: Not on file    Number of

## 2024-05-28 ENCOUNTER — OFFICE VISIT (OUTPATIENT)
Dept: PRIMARY CARE CLINIC | Age: 64
End: 2024-05-28
Payer: COMMERCIAL

## 2024-05-28 VITALS
BODY MASS INDEX: 28.95 KG/M2 | TEMPERATURE: 97.1 F | SYSTOLIC BLOOD PRESSURE: 118 MMHG | OXYGEN SATURATION: 97 % | WEIGHT: 191 LBS | HEART RATE: 83 BPM | RESPIRATION RATE: 16 BRPM | HEIGHT: 68 IN | DIASTOLIC BLOOD PRESSURE: 84 MMHG

## 2024-05-28 DIAGNOSIS — I10 PRIMARY HYPERTENSION: Primary | ICD-10-CM

## 2024-05-28 DIAGNOSIS — E11.9 DIET-CONTROLLED DIABETES MELLITUS (HCC): ICD-10-CM

## 2024-05-28 DIAGNOSIS — F79 INTELLECTUAL DISABILITY: ICD-10-CM

## 2024-05-28 DIAGNOSIS — E78.5 HYPERLIPIDEMIA, UNSPECIFIED HYPERLIPIDEMIA TYPE: ICD-10-CM

## 2024-05-28 DIAGNOSIS — Z12.5 SCREENING FOR PROSTATE CANCER: ICD-10-CM

## 2024-05-28 DIAGNOSIS — Z72.0 TOBACCO ABUSE: ICD-10-CM

## 2024-05-28 LAB
ABSOLUTE BANDS: NORMAL K/UL (ref 0–1)
ABSOLUTE PLASMA CELLS: NORMAL K/UL
ALBUMIN: 4.1 G/DL (ref 3.5–5.2)
ALP BLD-CCNC: 91 U/L (ref 40–129)
ALT SERPL-CCNC: 30 U/L (ref 0–40)
ANION GAP SERPL CALCULATED.3IONS-SCNC: 15 MMOL/L (ref 7–16)
AST SERPL-CCNC: 30 U/L (ref 0–39)
ATYPICAL LYMPHOCYTE ABSOLUTE COUNT: NORMAL K/UL
ATYPICAL LYMPHOCYTES: NORMAL %
BANDS: NORMAL %
BASOPHILS ABSOLUTE: NORMAL K/UL (ref 0–0.2)
BASOPHILS RELATIVE PERCENT: NORMAL % (ref 0–2)
BILIRUB SERPL-MCNC: 0.4 MG/DL (ref 0–1.2)
BLASTS ABSOLUTE COUNT: NORMAL K/UL
BLASTS: NORMAL %
BUN BLDV-MCNC: 12 MG/DL (ref 6–23)
CALCIUM SERPL-MCNC: 9.3 MG/DL (ref 8.6–10.2)
CHLORIDE BLD-SCNC: 106 MMOL/L (ref 98–107)
CHOLESTEROL, TOTAL: 117 MG/DL
CO2: 17 MMOL/L (ref 22–29)
CREAT SERPL-MCNC: 1.2 MG/DL (ref 0.7–1.2)
EOSINOPHILS ABSOLUTE: NORMAL K/UL (ref 0–0.4)
ESTIMATED AVERAGE GLUCOSE: NORMAL MG/DL
GFR, ESTIMATED: 68 ML/MIN/1.73M2
GLUCOSE BLD-MCNC: 91 MG/DL (ref 74–99)
HBA1C MFR BLD: NORMAL % (ref 4–6)
HCT VFR BLD CALC: NORMAL % (ref 40.7–50.3)
HDLC SERPL-MCNC: 48 MG/DL
HEMOGLOBIN A1C RETENTION TIME - VARIANT: NORMAL %
HEMOGLOBIN C PEAK: NORMAL %
HEMOGLOBIN: NORMAL G/DL (ref 13–17)
IMMATURE GRANULOCYTES %: NORMAL %
IMMATURE GRANULOCYTES ABSOLUTE: NORMAL K/UL (ref 0–0.3)
LDL CHOLESTEROL: 52 MG/DL
LYMPHOCYTES ABSOLUTE: NORMAL K/UL (ref 1–4.8)
LYMPHOCYTES RELATIVE PERCENT: NORMAL % (ref 24–44)
MCH RBC QN AUTO: NORMAL PG (ref 25.2–33.5)
MCHC RBC AUTO-ENTMCNC: NORMAL G/DL (ref 28.4–34.8)
MCV RBC AUTO: NORMAL FL (ref 82.6–102.9)
METAMYELOCYTES ABSOLUTE COUNT: NORMAL K/UL
METAMYELOCYTES: NORMAL %
MONOCYTES ABSOLUTE: NORMAL K/UL (ref 0.1–0.8)
MONOCYTES RELATIVE PERCENT: NORMAL % (ref 1–7)
MYELOCYTES ABSOLUTE COUNT: NORMAL K/UL
MYELOCYTES: NORMAL %
NEUTROPHILS ABSOLUTE: NORMAL K/UL (ref 1.8–7.7)
NEUTROPHILS RELATIVE PERCENT: NORMAL % (ref 36–66)
NRBC AUTOMATED: NORMAL PER 100 WBC
NUCLEATED RED BLOOD CELLS: NORMAL PER 100 WBC
P3 PEAK - VARIANT: NORMAL %
P4 PEAK: NORMAL
PDW BLD-RTO: NORMAL % (ref 11.8–14.4)
PLASMA CELLS: NORMAL %
PLATELET # BLD: NORMAL K/UL (ref 138–453)
PLATELET ESTIMATE: NORMAL
PLATELET, FLUORESCENCE: NORMAL K/UL (ref 138–453)
PLATELET, IMMATURE FRACTION: NORMAL % (ref 1.1–10.3)
PMV BLD AUTO: NORMAL FL (ref 8.1–13.5)
POTASSIUM SERPL-SCNC: 4.4 MMOL/L (ref 3.5–5)
PROMYELOCYTES ABSOLUTE COUNT: NORMAL K/UL
PROMYELOCYTES: NORMAL %
PROSTATE SPECIFIC ANTIGEN: 0.6 NG/ML (ref 0–4)
RBC # BLD: NORMAL 10*6/UL
RBC # BLD: NORMAL M/UL (ref 4.21–5.77)
SODIUM BLD-SCNC: 138 MMOL/L (ref 132–146)
TOTAL PROTEIN: 7.5 G/DL (ref 6.4–8.3)
TRIGL SERPL-MCNC: 85 MG/DL
TSH SERPL DL<=0.05 MIU/L-ACNC: 2.45 UIU/ML (ref 0.27–4.2)
VARIANT HEMOGLOBIN PEAK: NORMAL %
VLDLC SERPL CALC-MCNC: 17 MG/DL
WBC # BLD: NORMAL 10*3/UL
WBC # BLD: NORMAL K/UL (ref 3.5–11.3)

## 2024-05-28 PROCEDURE — 3074F SYST BP LT 130 MM HG: CPT | Performed by: FAMILY MEDICINE

## 2024-05-28 PROCEDURE — 3079F DIAST BP 80-89 MM HG: CPT | Performed by: FAMILY MEDICINE

## 2024-05-28 PROCEDURE — 99214 OFFICE O/P EST MOD 30 MIN: CPT | Performed by: FAMILY MEDICINE

## 2024-05-28 PROCEDURE — 1123F ACP DISCUSS/DSCN MKR DOCD: CPT | Performed by: FAMILY MEDICINE

## 2024-05-28 SDOH — ECONOMIC STABILITY: FOOD INSECURITY: WITHIN THE PAST 12 MONTHS, THE FOOD YOU BOUGHT JUST DIDN'T LAST AND YOU DIDN'T HAVE MONEY TO GET MORE.: NEVER TRUE

## 2024-05-28 SDOH — ECONOMIC STABILITY: FOOD INSECURITY: WITHIN THE PAST 12 MONTHS, YOU WORRIED THAT YOUR FOOD WOULD RUN OUT BEFORE YOU GOT MONEY TO BUY MORE.: NEVER TRUE

## 2024-05-28 SDOH — ECONOMIC STABILITY: INCOME INSECURITY: HOW HARD IS IT FOR YOU TO PAY FOR THE VERY BASICS LIKE FOOD, HOUSING, MEDICAL CARE, AND HEATING?: NOT HARD AT ALL

## 2024-05-28 ASSESSMENT — PATIENT HEALTH QUESTIONNAIRE - PHQ9
2. FEELING DOWN, DEPRESSED OR HOPELESS: NOT AT ALL
SUM OF ALL RESPONSES TO PHQ QUESTIONS 1-9: 0
1. LITTLE INTEREST OR PLEASURE IN DOING THINGS: NOT AT ALL
SUM OF ALL RESPONSES TO PHQ9 QUESTIONS 1 & 2: 0
SUM OF ALL RESPONSES TO PHQ QUESTIONS 1-9: 0

## 2024-11-08 DIAGNOSIS — E78.5 HYPERLIPIDEMIA, UNSPECIFIED HYPERLIPIDEMIA TYPE: ICD-10-CM

## 2024-11-08 DIAGNOSIS — I10 ESSENTIAL HYPERTENSION: ICD-10-CM

## 2024-11-08 RX ORDER — AMLODIPINE AND BENAZEPRIL HYDROCHLORIDE 10; 20 MG/1; MG/1
CAPSULE ORAL
Qty: 90 CAPSULE | Refills: 3 | Status: SHIPPED | OUTPATIENT
Start: 2024-11-08

## 2024-11-08 RX ORDER — ATORVASTATIN CALCIUM 10 MG/1
10 TABLET, FILM COATED ORAL
Qty: 36 TABLET | Refills: 3 | Status: SHIPPED | OUTPATIENT
Start: 2024-11-08

## 2024-11-08 NOTE — TELEPHONE ENCOUNTER
Name of Medication(s) Requested:  Requested Prescriptions     Pending Prescriptions Disp Refills    amLODIPine-benazepril (LOTREL) 10-20 MG per capsule [Pharmacy Med Name: AMLODIPINE-BENAZEPRIL 10-20 MG] 90 capsule 3     Sig: TAKE 1 CAPSULE BY MOUTH EVERY DAY    atorvastatin (LIPITOR) 10 MG tablet [Pharmacy Med Name: ATORVASTATIN 10 MG TABLET] 36 tablet 3     Sig: TAKE 1 TABLET BY MOUTH THREE TIMES A WEEK (MONDAY, WEDNESDAY, FRIDAY)       Medication is on current medication list Yes    Dosage and directions were verified? Yes    Quantity verified: 90 day supply     Pharmacy Verified?  Yes    Last Appointment:  5/28/2024    Future appts:  Future Appointments   Date Time Provider Department Center   11/25/2024  8:30 AM Hector Orellana MD [facility]        (If no appt send self scheduling link. .REFILLAPPT)  Scheduling request sent?     [] Yes  [] No    Does patient need updated?  [] Yes  [] No

## 2024-12-02 NOTE — PROGRESS NOTES
12/3/2024    Chief Complaint   Patient presents with    Hypertension     HPI:  Seen with sister Shilpa. Patient says he has been doing well. He has not had any recent illness or health problems. Sister also confirms that he has been doing good over the last few months. He takes his meds daily. He has been watching his diet and drinks only water. He smokes 2 cigs/day and he's trying to cut down. He has not had any respiratory or heart symptoms.    REVIEW OF SYSTEMS: As per HPI.    No Known Allergies    Past Medical History:   Diagnosis Date    Benign prostatic hyperplasia     COPD (chronic obstructive pulmonary disease) (HCC)     Developmental delay     Hard of hearing     Hyperlipidemia     Hypertension     Obesity     Tobacco abuse 04/26/2016     Past Surgical History:   Procedure Laterality Date    INGUINAL HERNIA REPAIR      lap left inguinal hernia repair with mesh     Social History     Socioeconomic History    Marital status: Single     Spouse name: Not on file    Number of children: Not on file    Years of education: Not on file    Highest education level: Not on file   Occupational History    Not on file   Tobacco Use    Smoking status: Some Days     Current packs/day: 0.12     Average packs/day: 0.1 packs/day for 35.0 years (4.2 ttl pk-yrs)     Types: Cigarettes     Passive exposure: Current    Smokeless tobacco: Never   Substance and Sexual Activity    Alcohol use: No    Drug use: No    Sexual activity: Not on file   Other Topics Concern    Not on file   Social History Narrative    Not on file     Social Determinants of Health     Financial Resource Strain: Low Risk  (5/28/2024)    Overall Financial Resource Strain (CARDIA)     Difficulty of Paying Living Expenses: Not hard at all   Food Insecurity: No Food Insecurity (5/28/2024)    Hunger Vital Sign     Worried About Running Out of Food in the Last Year: Never true     Ran Out of Food in the Last Year: Never true   Transportation Needs: Unknown (5/28/2024)

## 2024-12-03 ENCOUNTER — OFFICE VISIT (OUTPATIENT)
Dept: PRIMARY CARE CLINIC | Age: 64
End: 2024-12-03

## 2024-12-03 VITALS
TEMPERATURE: 97.2 F | SYSTOLIC BLOOD PRESSURE: 100 MMHG | RESPIRATION RATE: 16 BRPM | OXYGEN SATURATION: 97 % | HEART RATE: 79 BPM | BODY MASS INDEX: 28.19 KG/M2 | HEIGHT: 68 IN | WEIGHT: 186 LBS | DIASTOLIC BLOOD PRESSURE: 80 MMHG

## 2024-12-03 DIAGNOSIS — E11.9 DIET-CONTROLLED DIABETES MELLITUS (HCC): ICD-10-CM

## 2024-12-03 DIAGNOSIS — I10 PRIMARY HYPERTENSION: Primary | ICD-10-CM

## 2024-12-03 DIAGNOSIS — E78.5 HYPERLIPIDEMIA, UNSPECIFIED HYPERLIPIDEMIA TYPE: ICD-10-CM

## 2024-12-03 DIAGNOSIS — Z12.5 SCREENING FOR PROSTATE CANCER: ICD-10-CM

## 2024-12-03 DIAGNOSIS — F79 INTELLECTUAL DISABILITY: ICD-10-CM

## 2024-12-03 LAB — HBA1C MFR BLD: 6.1 %

## 2025-07-06 NOTE — PROGRESS NOTES
7/7/2025    Chief Complaint   Patient presents with    Diabetes     HPI:  Seen with sister Shilpa. Patient says he has been doing well. He has not had any recent illness or health problems. Sister also confirms that he has been doing good over the last few months. He takes his meds daily. He has been watching his diet and drinks only water. He smokes 2-3 cigs/day and he's trying to cut down. He has not had any respiratory or heart symptoms.    REVIEW OF SYSTEMS: As per HPI.    No Known Allergies    Past Medical History:   Diagnosis Date    Benign prostatic hyperplasia     COPD (chronic obstructive pulmonary disease) (HCC)     Developmental delay     Hard of hearing     Hyperlipidemia     Hypertension     Obesity     Tobacco abuse 04/26/2016     Past Surgical History:   Procedure Laterality Date    INGUINAL HERNIA REPAIR      lap left inguinal hernia repair with mesh     Social History     Socioeconomic History    Marital status: Single     Spouse name: Not on file    Number of children: Not on file    Years of education: Not on file    Highest education level: Not on file   Occupational History    Not on file   Tobacco Use    Smoking status: Some Days     Current packs/day: 0.12     Average packs/day: 0.1 packs/day for 35.0 years (4.2 ttl pk-yrs)     Types: Cigarettes     Passive exposure: Current    Smokeless tobacco: Never   Substance and Sexual Activity    Alcohol use: No    Drug use: No    Sexual activity: Defer   Other Topics Concern    Not on file   Social History Narrative    Not on file     Social Drivers of Health     Financial Resource Strain: Low Risk  (5/28/2024)    Overall Financial Resource Strain (CARDIA)     Difficulty of Paying Living Expenses: Not hard at all   Food Insecurity: No Food Insecurity (7/7/2025)    Hunger Vital Sign     Worried About Running Out of Food in the Last Year: Never true     Ran Out of Food in the Last Year: Never true   Transportation Needs: No Transportation Needs (7/7/2025)

## 2025-07-07 ENCOUNTER — OFFICE VISIT (OUTPATIENT)
Dept: PRIMARY CARE CLINIC | Age: 65
End: 2025-07-07
Payer: MEDICARE

## 2025-07-07 ENCOUNTER — RESULTS FOLLOW-UP (OUTPATIENT)
Dept: PRIMARY CARE CLINIC | Age: 65
End: 2025-07-07

## 2025-07-07 VITALS
HEIGHT: 68 IN | SYSTOLIC BLOOD PRESSURE: 134 MMHG | DIASTOLIC BLOOD PRESSURE: 87 MMHG | WEIGHT: 193 LBS | HEART RATE: 86 BPM | OXYGEN SATURATION: 96 % | BODY MASS INDEX: 29.25 KG/M2 | RESPIRATION RATE: 16 BRPM | TEMPERATURE: 97.1 F

## 2025-07-07 VITALS
WEIGHT: 193 LBS | BODY MASS INDEX: 29.25 KG/M2 | OXYGEN SATURATION: 96 % | RESPIRATION RATE: 16 BRPM | TEMPERATURE: 97.1 F | DIASTOLIC BLOOD PRESSURE: 87 MMHG | SYSTOLIC BLOOD PRESSURE: 134 MMHG | HEIGHT: 68 IN | HEART RATE: 86 BPM

## 2025-07-07 DIAGNOSIS — F79 INTELLECTUAL DISABILITY: ICD-10-CM

## 2025-07-07 DIAGNOSIS — E11.9 DIET-CONTROLLED DIABETES MELLITUS (HCC): ICD-10-CM

## 2025-07-07 DIAGNOSIS — Z12.5 SCREENING FOR PROSTATE CANCER: ICD-10-CM

## 2025-07-07 DIAGNOSIS — Z00.00 WELCOME TO MEDICARE PREVENTIVE VISIT: Primary | ICD-10-CM

## 2025-07-07 DIAGNOSIS — Z12.12 SCREENING FOR COLORECTAL CANCER: ICD-10-CM

## 2025-07-07 DIAGNOSIS — E78.5 HYPERLIPIDEMIA, UNSPECIFIED HYPERLIPIDEMIA TYPE: ICD-10-CM

## 2025-07-07 DIAGNOSIS — I10 PRIMARY HYPERTENSION: Primary | ICD-10-CM

## 2025-07-07 DIAGNOSIS — Z12.11 SCREENING FOR COLORECTAL CANCER: ICD-10-CM

## 2025-07-07 LAB
ALBUMIN: 3.9 G/DL (ref 3.5–5.2)
ALP BLD-CCNC: 90 U/L (ref 40–129)
ALT SERPL-CCNC: 40 U/L (ref 0–50)
ANION GAP SERPL CALCULATED.3IONS-SCNC: 14 MMOL/L (ref 7–16)
AST SERPL-CCNC: 38 U/L (ref 0–50)
BASOPHILS ABSOLUTE: 0.06 K/UL (ref 0–0.2)
BASOPHILS RELATIVE PERCENT: 1 % (ref 0–2)
BILIRUB SERPL-MCNC: 0.5 MG/DL (ref 0–1.2)
BUN BLDV-MCNC: 11 MG/DL (ref 8–23)
CALCIUM SERPL-MCNC: 9.7 MG/DL (ref 8.8–10.2)
CHLORIDE BLD-SCNC: 107 MMOL/L (ref 98–107)
CHOLESTEROL, TOTAL: 118 MG/DL
CO2: 21 MMOL/L (ref 22–29)
CREAT SERPL-MCNC: 1.2 MG/DL (ref 0.7–1.2)
EOSINOPHILS ABSOLUTE: 0.2 K/UL (ref 0.05–0.5)
EOSINOPHILS RELATIVE PERCENT: 2 % (ref 0–6)
GFR, ESTIMATED: 69 ML/MIN/1.73M2
GLUCOSE BLD-MCNC: 99 MG/DL (ref 74–99)
HBA1C MFR BLD: 6.5 % (ref 4–5.6)
HCT VFR BLD CALC: 44.4 % (ref 37–54)
HDLC SERPL-MCNC: 51 MG/DL
HEMOGLOBIN: 15 G/DL (ref 12.5–16.5)
IMMATURE GRANULOCYTES %: 0 % (ref 0–5)
IMMATURE GRANULOCYTES ABSOLUTE: 0.04 K/UL (ref 0–0.58)
LDL CHOLESTEROL: 44 MG/DL
LYMPHOCYTES ABSOLUTE: 3.04 K/UL (ref 1.5–4)
LYMPHOCYTES RELATIVE PERCENT: 29 % (ref 20–42)
MCH RBC QN AUTO: 29.9 PG (ref 26–35)
MCHC RBC AUTO-ENTMCNC: 33.8 G/DL (ref 32–34.5)
MCV RBC AUTO: 88.6 FL (ref 80–99.9)
MONOCYTES ABSOLUTE: 0.77 K/UL (ref 0.1–0.95)
MONOCYTES RELATIVE PERCENT: 7 % (ref 2–12)
NEUTROPHILS ABSOLUTE: 6.51 K/UL (ref 1.8–7.3)
NEUTROPHILS RELATIVE PERCENT: 61 % (ref 43–80)
PDW BLD-RTO: 14 % (ref 11.5–15)
PLATELET # BLD: 300 K/UL (ref 130–450)
PMV BLD AUTO: 10 FL (ref 7–12)
POTASSIUM SERPL-SCNC: 4.2 MMOL/L (ref 3.5–5.1)
PROSTATE SPECIFIC ANTIGEN: 0.5 NG/ML (ref 0–4)
RBC # BLD: 5.01 M/UL (ref 3.8–5.8)
SODIUM BLD-SCNC: 142 MMOL/L (ref 136–145)
TOTAL PROTEIN: 7.5 G/DL (ref 6.4–8.3)
TRIGL SERPL-MCNC: 116 MG/DL
TSH SERPL DL<=0.05 MIU/L-ACNC: 2.33 UIU/ML (ref 0.27–4.2)
VLDLC SERPL CALC-MCNC: 23 MG/DL
WBC # BLD: 10.6 K/UL (ref 4.5–11.5)

## 2025-07-07 PROCEDURE — 3079F DIAST BP 80-89 MM HG: CPT | Performed by: FAMILY MEDICINE

## 2025-07-07 PROCEDURE — 1159F MED LIST DOCD IN RCRD: CPT | Performed by: FAMILY MEDICINE

## 2025-07-07 PROCEDURE — 1160F RVW MEDS BY RX/DR IN RCRD: CPT | Performed by: FAMILY MEDICINE

## 2025-07-07 PROCEDURE — 1123F ACP DISCUSS/DSCN MKR DOCD: CPT | Performed by: FAMILY MEDICINE

## 2025-07-07 PROCEDURE — G0402 INITIAL PREVENTIVE EXAM: HCPCS | Performed by: FAMILY MEDICINE

## 2025-07-07 PROCEDURE — 3075F SYST BP GE 130 - 139MM HG: CPT | Performed by: FAMILY MEDICINE

## 2025-07-07 PROCEDURE — 99214 OFFICE O/P EST MOD 30 MIN: CPT | Performed by: FAMILY MEDICINE

## 2025-07-07 SDOH — ECONOMIC STABILITY: FOOD INSECURITY: WITHIN THE PAST 12 MONTHS, YOU WORRIED THAT YOUR FOOD WOULD RUN OUT BEFORE YOU GOT MONEY TO BUY MORE.: NEVER TRUE

## 2025-07-07 SDOH — ECONOMIC STABILITY: FOOD INSECURITY: WITHIN THE PAST 12 MONTHS, THE FOOD YOU BOUGHT JUST DIDN'T LAST AND YOU DIDN'T HAVE MONEY TO GET MORE.: NEVER TRUE

## 2025-07-07 ASSESSMENT — PATIENT HEALTH QUESTIONNAIRE - PHQ9
SUM OF ALL RESPONSES TO PHQ QUESTIONS 1-9: 0
2. FEELING DOWN, DEPRESSED OR HOPELESS: NOT AT ALL
SUM OF ALL RESPONSES TO PHQ QUESTIONS 1-9: 0
1. LITTLE INTEREST OR PLEASURE IN DOING THINGS: NOT AT ALL

## 2025-07-07 ASSESSMENT — LIFESTYLE VARIABLES
HOW MANY STANDARD DRINKS CONTAINING ALCOHOL DO YOU HAVE ON A TYPICAL DAY: PATIENT DOES NOT DRINK
HOW OFTEN DO YOU HAVE A DRINK CONTAINING ALCOHOL: NEVER

## 2025-07-07 ASSESSMENT — VISUAL ACUITY
OD_CC: 20/70
OS_CC: 20/70

## 2025-07-07 NOTE — PROGRESS NOTES
Medicare Annual Wellness Visit    William Villeda is here for Medicare AWV (Welcome )    Assessment & Plan   Welcome to Medicare preventive visit  Screening for colorectal cancer  -     FIT-DNA (Cologuard)     Return for Medicare Annual Wellness Visit in 1 year.     Subjective     Patient's complete Health Risk Assessment and screening values have been reviewed and are found in Flowsheets. The following problems were reviewed today and where indicated follow up appointments were made and/or referrals ordered.    Positive Risk Factor Screenings with Interventions:     Cognitive:   Clock Drawing Test (CDT): (!) Abnormal  Words recalled: 3 Words Recalled  Total Score: 3  Total Score Interpretation: Normal Mini-Cog  Interventions:  Patient advised to follow-up in this office for further evaluation and treatment            Inactivity:  On average, how many days per week do you engage in moderate to strenuous exercise (like a brisk walk)?: 2 days (!) Abnormal  On average, how many minutes do you engage in exercise at this level?: 30 min  Interventions:  Recommendations: patient agrees to exercise for at least 150 minutes/week        Vision Screen:  Visual Acuity screen is abnormal due to a score of 20/25 or worse.  Interventions:   Patient encouraged to make appointment with their eye specialist      Advanced Directives:  Do you have a Living Will?: (!) No    Intervention:  Sister Shilpa is decision maker.    Tobacco Use:    Tobacco Use      Smoking status: Some Days        Packs/day: 0.12        Years: 0.1 packs/day for 35.0 years (4.2 ttl pk-yrs)        Types: Cigarettes        Passive exposure: Current      Smokeless tobacco: Never     Interventions:  3 cigs/day        Objective   Vision Screening    Right eye Left eye Both eyes   Without correction      With correction 20/70 20/70 20/50      Vitals:    07/07/25 0904   BP: 134/87   Pulse: 86   Resp: 16   Temp: 97.1 °F (36.2 °C)   SpO2: 96%   Weight: 87.5 kg (193 lb)

## 2025-07-25 LAB — NONINV COLON CA DNA+OCC BLD SCRN STL QL: NEGATIVE
